# Patient Record
Sex: MALE | Employment: FULL TIME | URBAN - METROPOLITAN AREA
[De-identification: names, ages, dates, MRNs, and addresses within clinical notes are randomized per-mention and may not be internally consistent; named-entity substitution may affect disease eponyms.]

---

## 2024-07-03 ENCOUNTER — TELEPHONE (OUTPATIENT)
Dept: INTERVENTIONAL RADIOLOGY/VASCULAR | Facility: CLINIC | Age: 79
End: 2024-07-03
Payer: MEDICARE

## 2024-07-18 ENCOUNTER — OFFICE VISIT (OUTPATIENT)
Dept: INTERVENTIONAL RADIOLOGY/VASCULAR | Facility: CLINIC | Age: 79
End: 2024-07-18
Payer: MEDICARE

## 2024-07-18 VITALS
HEIGHT: 72 IN | HEART RATE: 68 BPM | BODY MASS INDEX: 21.35 KG/M2 | SYSTOLIC BLOOD PRESSURE: 149 MMHG | DIASTOLIC BLOOD PRESSURE: 80 MMHG | WEIGHT: 157.63 LBS

## 2024-07-18 DIAGNOSIS — C78.7 CANCER, METASTATIC TO LIVER: Primary | ICD-10-CM

## 2024-07-18 DIAGNOSIS — C19 COLORECTAL CANCER: ICD-10-CM

## 2024-07-18 PROCEDURE — 1159F MED LIST DOCD IN RCRD: CPT | Mod: CPTII,S$GLB,, | Performed by: FAMILY MEDICINE

## 2024-07-18 PROCEDURE — 3079F DIAST BP 80-89 MM HG: CPT | Mod: CPTII,S$GLB,, | Performed by: FAMILY MEDICINE

## 2024-07-18 PROCEDURE — 99204 OFFICE O/P NEW MOD 45 MIN: CPT | Mod: S$GLB,,, | Performed by: FAMILY MEDICINE

## 2024-07-18 PROCEDURE — 99999 PR PBB SHADOW E&M-EST. PATIENT-LVL III: CPT | Mod: PBBFAC,,, | Performed by: FAMILY MEDICINE

## 2024-07-18 PROCEDURE — 3077F SYST BP >= 140 MM HG: CPT | Mod: CPTII,S$GLB,, | Performed by: FAMILY MEDICINE

## 2024-07-18 PROCEDURE — 1160F RVW MEDS BY RX/DR IN RCRD: CPT | Mod: CPTII,S$GLB,, | Performed by: FAMILY MEDICINE

## 2024-07-18 RX ORDER — ASPIRIN 325 MG
325 TABLET ORAL DAILY
COMMUNITY

## 2024-07-18 RX ORDER — TRAZODONE HYDROCHLORIDE 50 MG/1
50 TABLET ORAL NIGHTLY
COMMUNITY

## 2024-07-18 NOTE — PROGRESS NOTES
Patient seen in IR clinic today.  Scheduled for upcoming IR procedure.  Pre-procedure instructions were reviewed with patient and family.  Patient instructed not to eat or drink anything after midnight the night before procedure.  Pt aware will need someone to provide transport home and monitor pt 8 hours post procedure.  No driving for at least 24 hours after procedure.   Patient informed that [facility] nurse will call 1 or 2 days before the procedure they will give instructions including what time to stop eating and drinking and which medications to take. Patient and family informed by NP to stop taking Aspirin and and Aspirin containing medications 5 days prior to procedure. Pt verbalized understanding of all pre-procedure instructions.  Written instructions given at appointment today/sent to patient in Mychart/portal.

## 2024-07-18 NOTE — PROGRESS NOTES
"Subjective     Patient ID: Edil Goff is a 79 y.o. male.    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 During screening colonoscopy on 8/2/2023, tumor identified and biopsied. Pathology revealed "INVASIVE ADENOCARCINOMA, MODERATELY DIFFERENTIATED (GRADE 2)." Patient underwent resection and also underwent treatment with adjuvant chemotherapy. Patient's daughter reports chemo "almost killed him." Patient was started on immunotherapy, but has shown progression despite this. MRI obtained on 6/25/2024 noted "increase in size of heterogeneously enhancing lesions in the right and left lobes of the liver the largest cyst within the dome of the right lobe and segment 8 measuring 2 cm. Previously this measured 8 mm." Patient reports feeling well. He denies any abdominal pain or abdominal distention. His family report a decreased appetite/early satiety. He is accompanied by his wife, daughter, and grandson.       Review of Systems   Constitutional:  Positive for appetite change (patient states normal; family reports early satiety) and fatigue (patient says no; wife says yes). Negative for activity change, chills and fever.   Respiratory:  Negative for cough, shortness of breath, wheezing and stridor.    Cardiovascular:  Negative for chest pain, palpitations and leg swelling.   Gastrointestinal:  Negative for abdominal distention, abdominal pain, constipation, diarrhea, nausea and vomiting.          Objective     Physical Exam  Constitutional:       General: He is not in acute distress.     Appearance: He is well-developed. He is not diaphoretic.   HENT:      Head: Normocephalic and atraumatic.   Pulmonary:      Effort: Pulmonary effort is normal. No respiratory distress.   Neurological:      Mental Status: He is alert and oriented to person, place, and time.   Psychiatric:         Behavior: Behavior normal.         " "Thought Content: Thought content normal.         Judgment: Judgment normal.       Reviewed oncology progress note in "care everywhere."    MRI 6/25/2024           Assessment and Plan     1. Cancer, metastatic to liver  -     IR RF Ablation Liver Tumors; Future; Expected date: 07/18/2024  -     Comprehensive Metabolic Panel; Future; Expected date: 07/18/2024  -     Protime-INR; Future; Expected date: 07/18/2024  -     CBC Auto Differential; Future; Expected date: 07/18/2024    2. Colorectal cancer  -     IR RF Ablation Liver Tumors; Future; Expected date: 07/18/2024  -     Comprehensive Metabolic Panel; Future; Expected date: 07/18/2024  -     Protime-INR; Future; Expected date: 07/18/2024  -     CBC Auto Differential; Future; Expected date: 07/18/2024        Discussed case with Dr. Mercado. Explained to patient can offer treatment with microwave ablation. Discussed how the procedure will be performed, risks (including, but not limited to, pain, bleeding, infection, damage to nearby structures, and the need for additional procedures), benefits, possible complications, pre-post procedure expectations, and alternatives. The patient voices understanding and all questions have been answered.  The patient agrees to proceed as planned. Patient scheduled for 8/14/2024. Pre-procedure handout with clinic phone number provided.         "

## 2024-07-18 NOTE — LETTER
July 18, 2024    Cristhian Patel MD  [address]  [place] [address]             [facility]  [address]  [facility] [address]  Phone: [phone]   Patient: Edil Goff   MR Number: [id number]   YOB: 1945   Date of Visit: 7/18/2024       Dear Dr. Cristhian Patel:    Thank you for referring Edil Goff to [facility] for evaluation. Attached you will find relevant portions of our assessment and plan of care.    If you have questions, please do not hesitate to call me. I look forward to following Edil Goff along with you.    Sincerely,      Janae Barker NP Enclosure

## 2024-07-18 NOTE — LETTER
July 18, 2024    Edil Goff  [facility]  [place] [address]     [facility] 6[facility]  [address]  [facility] [address]  Phone: [phone] PRE-PROCEDURE INSTRUCTIONS    Your procedure with Interventional Radiology is scheduled for _______________________.     Please arrive by _______________________. Please note your appointment time in [facility] will be different.    You must check-in and receive a wristband before going to your procedure. We will call you the day before to let you know your check-in location.    DO NOT take aspirin for 5 days before your procedure.    **Do not eat or drink anything between midnight and the time of your procedure. This includes gum, mints, and candy lemon drops.    **Do not smoke or drink alcoholic beverages 24 hours prior to your procedure.    **If you wear contact lenses, dentures, hearing aids, or glasses, bring a container to put them in during the procedure and give them to a family member for safekeeping.    **If you have been diagnosed with sleep apnea please bring your CPAP machine.    **If your doctor has scheduled you for an overnight stay, bring a small overnight bag with any personal items that you may need.    **Make arrangements in advance for transportation home by a responsible adult. It is not safe to drive a vehicle during the 24 hours following the procedure.    **All [facility] facilities and properties are tobacco free. Smoking is NOT allowed.    PLEASE NOTE: The procedure schedule has many variables which affect the time of your procedure. Family members should be available if your surgery time changes.    If you have any questions about these instructions call Interventional Radiology at [phone] Monday - Friday between 8:00am and 4:00pm or [phone] (ask for interventional radiology physician) for after hours.

## 2024-08-12 ENCOUNTER — DOCUMENTATION ONLY (OUTPATIENT)
Dept: PREADMISSION TESTING | Facility: HOSPITAL | Age: 79
End: 2024-08-12
Payer: MEDICARE

## 2024-08-12 RX ORDER — BUPROPION HYDROCHLORIDE 300 MG/1
300 TABLET ORAL NIGHTLY
COMMUNITY

## 2024-08-12 NOTE — PRE-PROCEDURE INSTRUCTIONS
PRE-OP INSTRUCTIONS TO PATIENT'S WIFE:  Instructed patient to have no food,milk or milk products after midnight 8/13/2024  It is ok to take AM medications with a few sips of water   Medication instructions for pm prior to and am of surgery reviewed.  Instructed patient to avoid taking vitamins,supplements,aspirin or ibuprofen the am of surgery.  Shower instructions provided    Patient's wife denies patient having any side effects or issues with anesthesia or sedation.

## 2024-08-14 ENCOUNTER — ANESTHESIA (OUTPATIENT)
Dept: INTERVENTIONAL RADIOLOGY/VASCULAR | Facility: HOSPITAL | Age: 79
End: 2024-08-14
Payer: MEDICARE

## 2024-08-14 ENCOUNTER — HOSPITAL ENCOUNTER (OUTPATIENT)
Dept: INTERVENTIONAL RADIOLOGY/VASCULAR | Facility: HOSPITAL | Age: 79
Discharge: HOME | End: 2024-08-14
Attending: FAMILY MEDICINE
Payer: MEDICARE

## 2024-08-14 VITALS
DIASTOLIC BLOOD PRESSURE: 89 MMHG | TEMPERATURE: 98 F | OXYGEN SATURATION: 100 % | HEART RATE: 53 BPM | WEIGHT: 165 LBS | SYSTOLIC BLOOD PRESSURE: 167 MMHG | BODY MASS INDEX: 22.35 KG/M2 | HEIGHT: 72 IN | RESPIRATION RATE: 16 BRPM

## 2024-08-14 DIAGNOSIS — C78.7 METASTATIC CANCER TO LIVER: ICD-10-CM

## 2024-08-14 DIAGNOSIS — C78.7 CANCER, METASTATIC TO LIVER: ICD-10-CM

## 2024-08-14 DIAGNOSIS — C19 COLORECTAL CANCER: ICD-10-CM

## 2024-08-14 DIAGNOSIS — C78.7 CANCER, METASTATIC TO LIVER: Primary | ICD-10-CM

## 2024-08-14 LAB
ALBUMIN SERPL BCP-MCNC: 3.9 G/DL (ref 3.5–5.2)
ALP SERPL-CCNC: 55 U/L (ref 55–135)
ALT SERPL W/O P-5'-P-CCNC: 13 U/L (ref 10–44)
ANION GAP SERPL CALC-SCNC: 8 MMOL/L (ref 8–16)
AST SERPL-CCNC: 19 U/L (ref 10–40)
BASOPHILS # BLD AUTO: 0.01 K/UL (ref 0–0.2)
BASOPHILS NFR BLD: 0.2 % (ref 0–1.9)
BILIRUB SERPL-MCNC: 0.8 MG/DL (ref 0.1–1)
BUN SERPL-MCNC: 19 MG/DL (ref 8–23)
CALCIUM SERPL-MCNC: 9.7 MG/DL (ref 8.7–10.5)
CHLORIDE SERPL-SCNC: 106 MMOL/L (ref 95–110)
CO2 SERPL-SCNC: 25 MMOL/L (ref 23–29)
CREAT SERPL-MCNC: 1.1 MG/DL (ref 0.5–1.4)
DIFFERENTIAL METHOD BLD: ABNORMAL
EOSINOPHIL # BLD AUTO: 0.2 K/UL (ref 0–0.5)
EOSINOPHIL NFR BLD: 2.6 % (ref 0–8)
ERYTHROCYTE [DISTWIDTH] IN BLOOD BY AUTOMATED COUNT: 13.2 % (ref 11.5–14.5)
EST. GFR  (NO RACE VARIABLE): >60 ML/MIN/1.73 M^2
GLUCOSE SERPL-MCNC: 96 MG/DL (ref 70–110)
HCT VFR BLD AUTO: 36.1 % (ref 40–54)
HGB BLD-MCNC: 11.2 G/DL (ref 14–18)
IMM GRANULOCYTES # BLD AUTO: 0.01 K/UL (ref 0–0.04)
IMM GRANULOCYTES NFR BLD AUTO: 0.2 % (ref 0–0.5)
INR PPP: 1.1 (ref 0.8–1.2)
LYMPHOCYTES # BLD AUTO: 1.2 K/UL (ref 1–4.8)
LYMPHOCYTES NFR BLD: 19.8 % (ref 18–48)
MCH RBC QN AUTO: 28 PG (ref 27–31)
MCHC RBC AUTO-ENTMCNC: 31 G/DL (ref 32–36)
MCV RBC AUTO: 90 FL (ref 82–98)
MONOCYTES # BLD AUTO: 0.7 K/UL (ref 0.3–1)
MONOCYTES NFR BLD: 12.1 % (ref 4–15)
NEUTROPHILS # BLD AUTO: 3.8 K/UL (ref 1.8–7.7)
NEUTROPHILS NFR BLD: 65.1 % (ref 38–73)
NRBC BLD-RTO: 0 /100 WBC
PLATELET # BLD AUTO: 245 K/UL (ref 150–450)
PMV BLD AUTO: 9.9 FL (ref 9.2–12.9)
POTASSIUM SERPL-SCNC: 4.3 MMOL/L (ref 3.5–5.1)
PROT SERPL-MCNC: 6.4 G/DL (ref 6–8.4)
PROTHROMBIN TIME: 11.7 SEC (ref 9–12.5)
RBC # BLD AUTO: 4 M/UL (ref 4.6–6.2)
SODIUM SERPL-SCNC: 139 MMOL/L (ref 136–145)
WBC # BLD AUTO: 5.8 K/UL (ref 3.9–12.7)

## 2024-08-14 PROCEDURE — 63600175 PHARM REV CODE 636 W HCPCS: Performed by: NURSE ANESTHETIST, CERTIFIED REGISTERED

## 2024-08-14 PROCEDURE — 63600175 PHARM REV CODE 636 W HCPCS

## 2024-08-14 PROCEDURE — 25000003 PHARM REV CODE 250: Performed by: NURSE ANESTHETIST, CERTIFIED REGISTERED

## 2024-08-14 PROCEDURE — 77013 CT GUIDE FOR TISSUE ABLATION: CPT | Mod: TC

## 2024-08-14 PROCEDURE — 37000008 HC ANESTHESIA 1ST 15 MINUTES

## 2024-08-14 PROCEDURE — 63600175 PHARM REV CODE 636 W HCPCS: Performed by: ANESTHESIOLOGY

## 2024-08-14 PROCEDURE — 80053 COMPREHEN METABOLIC PANEL: CPT | Performed by: FAMILY MEDICINE

## 2024-08-14 PROCEDURE — 85610 PROTHROMBIN TIME: CPT | Performed by: FAMILY MEDICINE

## 2024-08-14 PROCEDURE — 25000003 PHARM REV CODE 250: Performed by: STUDENT IN AN ORGANIZED HEALTH CARE EDUCATION/TRAINING PROGRAM

## 2024-08-14 PROCEDURE — 37000009 HC ANESTHESIA EA ADD 15 MINS

## 2024-08-14 PROCEDURE — 85025 COMPLETE CBC W/AUTO DIFF WBC: CPT | Performed by: FAMILY MEDICINE

## 2024-08-14 PROCEDURE — 27000221 HC OXYGEN, UP TO 24 HOURS

## 2024-08-14 PROCEDURE — 94761 N-INVAS EAR/PLS OXIMETRY MLT: CPT

## 2024-08-14 PROCEDURE — C2618 PROBE/NEEDLE, CRYO: HCPCS

## 2024-08-14 RX ORDER — FENTANYL CITRATE 50 UG/ML
INJECTION, SOLUTION INTRAMUSCULAR; INTRAVENOUS
Status: COMPLETED
Start: 2024-08-14 | End: 2024-08-14

## 2024-08-14 RX ORDER — OXYCODONE HYDROCHLORIDE 5 MG/1
10 TABLET ORAL ONCE AS NEEDED
Status: COMPLETED | OUTPATIENT
Start: 2024-08-14 | End: 2024-08-14

## 2024-08-14 RX ORDER — ONDANSETRON 4 MG/1
4 TABLET, FILM COATED ORAL EVERY 8 HOURS PRN
Qty: 15 TABLET | Refills: 0 | Status: SHIPPED | OUTPATIENT
Start: 2024-08-14

## 2024-08-14 RX ORDER — HYDROMORPHONE HYDROCHLORIDE 1 MG/ML
INJECTION, SOLUTION INTRAMUSCULAR; INTRAVENOUS; SUBCUTANEOUS
Status: COMPLETED
Start: 2024-08-14 | End: 2024-08-14

## 2024-08-14 RX ORDER — FENTANYL CITRATE 50 UG/ML
INJECTION, SOLUTION INTRAMUSCULAR; INTRAVENOUS
Status: DISCONTINUED | OUTPATIENT
Start: 2024-08-14 | End: 2024-08-14

## 2024-08-14 RX ORDER — FENTANYL CITRATE 50 UG/ML
25 INJECTION, SOLUTION INTRAMUSCULAR; INTRAVENOUS EVERY 5 MIN PRN
Status: COMPLETED | OUTPATIENT
Start: 2024-08-14 | End: 2024-08-14

## 2024-08-14 RX ORDER — DEXAMETHASONE SODIUM PHOSPHATE 4 MG/ML
INJECTION, SOLUTION INTRA-ARTICULAR; INTRALESIONAL; INTRAMUSCULAR; INTRAVENOUS; SOFT TISSUE
Status: DISCONTINUED | OUTPATIENT
Start: 2024-08-14 | End: 2024-08-14

## 2024-08-14 RX ORDER — LIDOCAINE HYDROCHLORIDE 20 MG/ML
INJECTION, SOLUTION EPIDURAL; INFILTRATION; INTRACAUDAL; PERINEURAL
Status: DISCONTINUED | OUTPATIENT
Start: 2024-08-14 | End: 2024-08-14

## 2024-08-14 RX ORDER — ONDANSETRON HYDROCHLORIDE 2 MG/ML
INJECTION, SOLUTION INTRAVENOUS
Status: DISCONTINUED | OUTPATIENT
Start: 2024-08-14 | End: 2024-08-14

## 2024-08-14 RX ORDER — HYDROMORPHONE HYDROCHLORIDE 1 MG/ML
0.2 INJECTION, SOLUTION INTRAMUSCULAR; INTRAVENOUS; SUBCUTANEOUS EVERY 5 MIN PRN
Status: DISCONTINUED | OUTPATIENT
Start: 2024-08-14 | End: 2024-08-14

## 2024-08-14 RX ORDER — DOCUSATE SODIUM 100 MG/1
100 CAPSULE, LIQUID FILLED ORAL 2 TIMES DAILY
Qty: 14 CAPSULE | Refills: 0 | Status: SHIPPED | OUTPATIENT
Start: 2024-08-14 | End: 2024-08-21

## 2024-08-14 RX ORDER — ONDANSETRON HYDROCHLORIDE 2 MG/ML
4 INJECTION, SOLUTION INTRAVENOUS DAILY PRN
Status: DISCONTINUED | OUTPATIENT
Start: 2024-08-14 | End: 2024-08-15 | Stop reason: HOSPADM

## 2024-08-14 RX ORDER — DEXMEDETOMIDINE HYDROCHLORIDE 100 UG/ML
INJECTION, SOLUTION INTRAVENOUS
Status: DISCONTINUED | OUTPATIENT
Start: 2024-08-14 | End: 2024-08-14

## 2024-08-14 RX ORDER — CEFAZOLIN SODIUM 1 G/3ML
INJECTION, POWDER, FOR SOLUTION INTRAMUSCULAR; INTRAVENOUS
Status: DISCONTINUED | OUTPATIENT
Start: 2024-08-14 | End: 2024-08-14

## 2024-08-14 RX ORDER — SODIUM CHLORIDE 9 MG/ML
INJECTION, SOLUTION INTRAVENOUS CONTINUOUS
Status: DISCONTINUED | OUTPATIENT
Start: 2024-08-14 | End: 2024-08-15 | Stop reason: HOSPADM

## 2024-08-14 RX ORDER — HYDROMORPHONE HYDROCHLORIDE 1 MG/ML
0.2 INJECTION, SOLUTION INTRAMUSCULAR; INTRAVENOUS; SUBCUTANEOUS EVERY 5 MIN PRN
Status: DISCONTINUED | OUTPATIENT
Start: 2024-08-14 | End: 2024-08-15 | Stop reason: HOSPADM

## 2024-08-14 RX ORDER — OXYCODONE HYDROCHLORIDE 5 MG/1
5 TABLET ORAL EVERY 6 HOURS PRN
Qty: 20 TABLET | Refills: 0 | Status: SHIPPED | OUTPATIENT
Start: 2024-08-14 | End: 2024-08-19

## 2024-08-14 RX ORDER — ROCURONIUM BROMIDE 10 MG/ML
INJECTION, SOLUTION INTRAVENOUS
Status: DISCONTINUED | OUTPATIENT
Start: 2024-08-14 | End: 2024-08-14

## 2024-08-14 RX ORDER — PROPOFOL 10 MG/ML
VIAL (ML) INTRAVENOUS
Status: DISCONTINUED | OUTPATIENT
Start: 2024-08-14 | End: 2024-08-14

## 2024-08-14 RX ORDER — LIDOCAINE HYDROCHLORIDE 10 MG/ML
1 INJECTION, SOLUTION EPIDURAL; INFILTRATION; INTRACAUDAL; PERINEURAL ONCE
Status: DISCONTINUED | OUTPATIENT
Start: 2024-08-14 | End: 2024-08-15 | Stop reason: HOSPADM

## 2024-08-14 RX ORDER — LABETALOL HCL 20 MG/4 ML
SYRINGE (ML) INTRAVENOUS
Status: DISCONTINUED
Start: 2024-08-14 | End: 2024-08-15 | Stop reason: HOSPADM

## 2024-08-14 RX ORDER — LABETALOL HCL 20 MG/4 ML
10 SYRINGE (ML) INTRAVENOUS ONCE
Status: COMPLETED | OUTPATIENT
Start: 2024-08-14 | End: 2024-08-14

## 2024-08-14 RX ORDER — AMOXICILLIN AND CLAVULANATE POTASSIUM 875; 125 MG/1; MG/1
1 TABLET, FILM COATED ORAL 2 TIMES DAILY
Qty: 10 TABLET | Refills: 0 | Status: SHIPPED | OUTPATIENT
Start: 2024-08-14

## 2024-08-14 RX ORDER — GLUCAGON 1 MG
1 KIT INJECTION
Status: DISCONTINUED | OUTPATIENT
Start: 2024-08-14 | End: 2024-08-15 | Stop reason: HOSPADM

## 2024-08-14 RX ADMIN — FENTANYL CITRATE 25 MCG: 50 INJECTION INTRAMUSCULAR; INTRAVENOUS at 02:08

## 2024-08-14 RX ADMIN — HYDROMORPHONE HYDROCHLORIDE 0.2 MG: 1 INJECTION, SOLUTION INTRAMUSCULAR; INTRAVENOUS; SUBCUTANEOUS at 02:08

## 2024-08-14 RX ADMIN — LIDOCAINE HYDROCHLORIDE 100 MG: 20 INJECTION, SOLUTION EPIDURAL; INFILTRATION; INTRACAUDAL; PERINEURAL at 11:08

## 2024-08-14 RX ADMIN — FENTANYL CITRATE 25 MCG: 50 INJECTION INTRAMUSCULAR; INTRAVENOUS at 01:08

## 2024-08-14 RX ADMIN — PROPOFOL 100 MG: 10 INJECTION, EMULSION INTRAVENOUS at 11:08

## 2024-08-14 RX ADMIN — CEFAZOLIN 2 G: 330 INJECTION, POWDER, FOR SOLUTION INTRAMUSCULAR; INTRAVENOUS at 11:08

## 2024-08-14 RX ADMIN — FENTANYL CITRATE 25 MCG: 50 INJECTION, SOLUTION INTRAMUSCULAR; INTRAVENOUS at 12:08

## 2024-08-14 RX ADMIN — SODIUM CHLORIDE: 0.9 INJECTION, SOLUTION INTRAVENOUS at 11:08

## 2024-08-14 RX ADMIN — SUGAMMADEX 200 MG: 100 INJECTION, SOLUTION INTRAVENOUS at 01:08

## 2024-08-14 RX ADMIN — Medication 10 MG: at 03:08

## 2024-08-14 RX ADMIN — ONDANSETRON 4 MG: 2 INJECTION INTRAMUSCULAR; INTRAVENOUS at 12:08

## 2024-08-14 RX ADMIN — ROCURONIUM BROMIDE 10 MG: 10 INJECTION INTRAVENOUS at 12:08

## 2024-08-14 RX ADMIN — DEXMEDETOMIDINE 8 MCG: 200 INJECTION, SOLUTION INTRAVENOUS at 01:08

## 2024-08-14 RX ADMIN — ROCURONIUM BROMIDE 50 MG: 10 INJECTION INTRAVENOUS at 11:08

## 2024-08-14 RX ADMIN — FENTANYL CITRATE 50 MCG: 50 INJECTION, SOLUTION INTRAMUSCULAR; INTRAVENOUS at 11:08

## 2024-08-14 RX ADMIN — OXYCODONE HYDROCHLORIDE 10 MG: 5 TABLET ORAL at 02:08

## 2024-08-14 RX ADMIN — DEXAMETHASONE SODIUM PHOSPHATE 4 MG: 4 INJECTION, SOLUTION INTRAMUSCULAR; INTRAVENOUS at 12:08

## 2024-08-14 NOTE — TRANSFER OF CARE
Anesthesia Transfer of Care Note    Patient: Edil Goff    Procedure(s) Performed: * No procedures listed *    Patient location: PACU    Anesthesia Type: general    Transport from OR: Transported from OR on 6-10 L/min O2 by face mask with adequate spontaneous ventilation    Post pain: adequate analgesia    Post assessment: no apparent anesthetic complications    Post vital signs: stable    Level of consciousness: awake and alert    Nausea/Vomiting: no nausea/vomiting    Complications: none    Transfer of care protocol was followed      Last vitals: Visit Vitals  BP (!) 176/84 (BP Location: Right arm, Patient Position: Lying)   Pulse 73   Temp 36.7 °C (98.1 °F) (Temporal)   Resp 14   Ht 6' (1.829 m)   Wt 74.8 kg (165 lb)   SpO2 99%   BMI 22.38 kg/m²

## 2024-08-14 NOTE — DISCHARGE SUMMARY
Radiology Discharge Summary      Hospital Course: No complications    Admit Date: 8/14/2024  Discharge Date: 08/14/2024     Instructions Given to Patient: Yes  Diet: Resume prior diet  Activity: activity as tolerated and no driving for today    Description of Condition on Discharge: Stable  Vital Signs (Most Recent): Temp: 98.1 °F (36.7 °C) (08/14/24 0828)  Pulse: 78 (08/14/24 0828)  Resp: 16 (08/14/24 0828)  BP: (!) 173/82 (08/14/24 0829)  SpO2: 100 % (08/14/24 0828)    Discharge Disposition: Home    Discharge Diagnosis: [facility]     Follow-up: MRI Abd WWO in 4-6 weeks.     Aspen Day MD

## 2024-08-14 NOTE — NURSING TRANSFER
Nursing Transfer Note      8/14/2024   4:18 PM    Nurse giving handoff: Samy JIMÉNEZ Rn  Nurse receiving handoff: DOSC Rn    Reason patient is being transferred: DOSC    Transfer To: #34 [facility]    Transfer via stretcher    Transfer with n/a    Transported by Rn    Transfer Vital Signs:  Blood Pressure:see flowsheet  Heart Rate:  O2:  Temperature:  Respirations:    Telemetry: n/a  Order for Tele Monitor? N/a    Additional Lines: n/a    4eyes on Skin: yes    Medicines sent: n/a    Any special needs or follow-up needed:     Patient belongings transferred with patient: Yes    Chart send with patient: Yes    Notified: spouse    Patient reassessed at: 8/14/24  1  Upon arrival to floor: bed in lowest position

## 2024-08-14 NOTE — PLAN OF CARE
Pt arrived to IR  for MWA with anesthesia. Pt oriented to unit and staff, Pt safely transferred from stretcher to procedural table. Fall risk reviewed and comfort measures utilized with interventions. Safety strap applied, position pillows to minimize pressure points. Blankets applied. Pt prepped and draped utilizing standard sterile technique. Patient placed on continuous monitoring, as required by sedation policy. Timeouts implemented utilizing standard universal time-out per department and facility policy. CRNA to remain at bedside with continuous monitoring. Pt resting comfortably. Denies pain/discomfort. Will continue to monitor. See anesthesia flow sheets for monitoring, medication administration, and updates. patient verbalizes understanding.

## 2024-08-14 NOTE — PROGRESS NOTES
Pt arrived from PACU. Report given by MARY ANN Pablo at the bedside. Vitals stable, dressings clean, dry, intact. Updated pt and family on plan of care.

## 2024-08-14 NOTE — H&P
Radiology History & Physical      SUBJECTIVE:     Chief Complaint: Metastatic Colorectal cancer    History of Present Illness:  Edil Goff is a 79 y.o. male with history of colon cancer with metastases to the liver. He underwent resection and adjuvant chemotherapy. Recent MRI showed heterogeneously enhancing lesions in the right and left lobes of the liver.     Patient presents today for microwave ablation of the hepatic lesions as shown below.         Past Medical History:   Diagnosis Date    Cancer     colon    Hyperlipidemia     Hypertension     TIA (transient ischemic attack)      Past Surgical History:   Procedure Laterality Date    COLON SURGERY  2023    resection    TONSILLECTOMY         Home Meds:   Prior to Admission medications    Medication Sig Start Date End Date Taking? Authorizing Provider   amLODIPine (NORVASC) 5 MG tablet Take 5 mg by mouth every morning.   Yes Provider, Historical   ascorbic acid (VITAMIN C ORAL) Take by mouth every morning.   Yes Provider, Historical   buPROPion (WELLBUTRIN XL) 300 MG 24 hr tablet Take 300 mg by mouth nightly.   Yes Provider, Historical   ergocalciferol, vitamin D2, (VITAMIN D2 ORAL) Take by mouth once daily.   Yes Provider, Historical   EScitalopram oxalate (LEXAPRO) 10 MG tablet Take 10 mg by mouth every morning.   Yes Provider, Historical   losartan (COZAAR) 50 MG tablet Take 50 mg by mouth every morning.   Yes Provider, Historical   rivastigmine tartrate (EXELON) 1.5 MG capsule Take 1.5 mg by mouth 2 (two) times daily.   Yes Provider, Historical   traZODone (DESYREL) 50 MG tablet Take 50 mg by mouth every evening.   Yes Provider, Historical   aspirin 325 MG tablet Take 325 mg by mouth once daily.    Provider, Historical   clonazePAM (KLONOPIN) 1 MG tablet Take 1 mg by mouth nightly as needed.    Provider, Historical     Anticoagulants/Antiplatelets: no anticoagulation    Allergies: Review of patient's allergies indicates:  No Known Allergies  Sedation  History:  no adverse reactions    Review of Systems:   Hematological: no known coagulopathies  Respiratory: no shortness of breath  Cardiovascular: no chest pain  Gastrointestinal: no abdominal pain  Genito-Urinary: no dysuria  Musculoskeletal: negative  Neurological: no TIA or stroke symptoms, slow to respond, states from recent chemotherapy        OBJECTIVE:     Vital Signs (Most Recent)  Temp: 98.1 °F (36.7 °C) (08/14/24 0828)  Pulse: 78 (08/14/24 0828)  Resp: 16 (08/14/24 0828)  BP: (!) 173/82 (08/14/24 0829)  SpO2: 100 % (08/14/24 0828)    Physical Exam:  ASA: per anesthesia  Mallampati: per anesthesia    General: no acute distress, anxious  Mental Status: alert and oriented to person, place and time, slow to respond (related to chemotherapy)  HEENT: normocephalic, atraumatic  Chest: unlabored breathing  Heart: regular heart rate  Abdomen: nondistended, nontender  Extremity: moves all extremities, no BL LE edema    Laboratory  Lab Results   Component Value Date    INR 1.1 08/14/2024       Lab Results   Component Value Date    WBC 5.80 08/14/2024    HGB 11.2 (L) 08/14/2024    HCT 36.1 (L) 08/14/2024    MCV 90 08/14/2024     08/14/2024      Lab Results   Component Value Date    GLU 96 08/14/2024     08/14/2024    K 4.3 08/14/2024     08/14/2024    CO2 25 08/14/2024    BUN 19 08/14/2024    CREATININE 1.1 08/14/2024    CALCIUM 9.7 08/14/2024    ALT 13 08/14/2024    AST 19 08/14/2024    ALBUMIN 3.9 08/14/2024    BILITOT 0.8 08/14/2024       ASSESSMENT/PLAN:     Sedation Plan: Per anesthesia  Patient will undergo MWA of the liver lesions.    Dianna Mercado MD  Radiology, PGY IV  [facility]

## 2024-08-14 NOTE — NURSING
Pt admitted in room 1 on SSCU. Pt is free from s/s of pain/distress. VS taken and wnl. Pt's preop questions completed and iv placed. Pt needs consents. Safety measures in place.

## 2024-08-14 NOTE — PROCEDURES
Interventional Radiology Procedure Note      Pre Op Diagnosis: mCRC  Post Op Diagnosis: Same    Procedure: CT-guided microwave ablation    Procedure performed by:  Aspen Day MD    Written Informed Consent Obtained: Yes  Specimen Removed: NO   Estimated Blood Loss: Minimal    Findings:     CT guided microwave ablation of segment 2 and 8 liver lesions.     Patient tolerated procedure well.         Aspen Day MD  Interventional Radiology

## 2024-08-14 NOTE — ANESTHESIA PREPROCEDURE EVALUATION
08/14/2024  Edil Goff is a 79 y.o., male.      Pre-op Assessment    I have reviewed the Patient Summary Reports.     I have reviewed the Nursing Notes. I have reviewed the NPO Status.   I have reviewed the Medications.     Review of Systems  Anesthesia Hx:  No problems with previous Anesthesia   History of prior surgery of interest to airway management or planning:            Denies Personal Hx of Anesthesia complications.                    Social:  Non-Smoker, No Alcohol Use       Hematology/Oncology:       -- Anemia:                  Current/Recent Cancer.                EENT/Dental:  EENT/Dental Normal           Cardiovascular:  Exercise tolerance: good   Hypertension       Denies Angina.       Denies PRINCE.                            Pulmonary:  Pulmonary Normal      Denies Shortness of breath.                  Renal/:  Renal/ Normal                 Hepatic/GI:     GERD, well controlled   Colon cancer with mets to the liver          Musculoskeletal:  Musculoskeletal Normal                Neurological:  TIA,         TIA several years ago                            Endocrine:  Endocrine Normal            Dermatological:  Skin Normal    Psych:  Psychiatric Normal                    Physical Exam  General: Well nourished, Cooperative, Alert and Oriented    Airway:  Mallampati: II / I  Mouth Opening: Normal  TM Distance: Normal  Tongue: Normal  Neck ROM: Normal ROM    Dental:  Intact    Chest/Lungs:  Clear to auscultation, Normal Respiratory Rate    Heart:  Rate: Normal  Rhythm: Regular Rhythm  Sounds: Normal        Anesthesia Plan  Type of Anesthesia, risks & benefits discussed:    Anesthesia Type: Gen ETT  Intra-op Monitoring Plan: Standard ASA Monitors  Post Op Pain Control Plan: multimodal analgesia and IV/PO Opioids PRN  Induction:  IV  Airway Plan: Video, Post-Induction  Informed Consent:  Informed consent signed with the Patient and all parties understand the risks and agree with anesthesia plan.  All questions answered.   ASA Score: 3  Day of Surgery Review of History & Physical: H&P Update referred to the surgeon/provider.    Ready For Surgery From Anesthesia Perspective.     .

## 2024-08-14 NOTE — PLAN OF CARE
Pt is AAOX4,VSS. Discharge instructions reviewed, pt and family verbalizes understanding. All questions answered. IV removed. IR team came to bedside and spoke with family. Bedrest completed, cleared for discharge. Pt ready for discharge.

## 2024-08-14 NOTE — ANESTHESIA RELEASE NOTE
Anesthesia Release from PACU Note    Patient: Edil Goff    Procedure(s) Performed: * No procedures listed *    Anesthesia type: general    Post pain: Adequate analgesia    Post assessment: no apparent anesthetic complications    Last Vitals: Visit Vitals  BP (!) 167/78 (BP Location: Left arm, Patient Position: Lying)   Pulse (!) 58   Temp 36.7 °C (98.1 °F) (Temporal)   Resp 20   Ht 6' (1.829 m)   Wt 74.8 kg (165 lb)   SpO2 (!) 93%   BMI 22.38 kg/m²       Post vital signs: stable    Level of consciousness: awake    Nausea/Vomiting: no nausea/no vomiting    Complications: none    Airway Patency: patent    Respiratory: unassisted    Cardiovascular: hypertension treated with Labetalol    Hydration: euvolemic

## 2024-08-14 NOTE — ANESTHESIA PROCEDURE NOTES
Intubation    Date/Time: 8/14/2024 11:47 AM    Performed by: Lombardi, Nicole A., CRNA  Authorized by: Marylu Velasquez MD    Intubation:     Induction:  Intravenous    Intubated:  Postinduction    Mask Ventilation:  Easy with oral airway    Attempts:  1    Attempted By:  CRNA    Method of Intubation:  Video laryngoscopy    Blade:  Srinivasan 3    Laryngeal View Grade: Grade I - full view of cords      Difficult Airway Encountered?: No      Complications:  None    Airway Device:  Oral endotracheal tube    Airway Device Size:  7.5    Style/Cuff Inflation:  Cuffed (inflated to minimal occlusive pressure)    Tube secured:  22    Secured at:  The lips    Placement Verified By:  Capnometry    Complicating Factors:  None    Findings Post-Intubation:  BS equal bilateral and atraumatic/condition of teeth unchanged

## 2024-08-14 NOTE — ANESTHESIA POSTPROCEDURE EVALUATION
Anesthesia Post Evaluation    Patient: Edil Goff    Procedure(s) Performed: * No procedures listed *    Final Anesthesia Type: general      Patient location during evaluation: PACU  Patient participation: Yes- Able to Participate  Level of consciousness: awake and alert  Post-procedure vital signs: reviewed and stable  Pain management: adequate  Airway patency: patent    PONV status at discharge: No PONV  Anesthetic complications: no      Cardiovascular status: blood pressure returned to baseline  Respiratory status: spontaneous ventilation and room air  Hydration status: euvolemic  Follow-up not needed.              Vitals Value Taken Time   /90 08/14/24 1444   Pulse 68 08/14/24 1444   Resp 29 08/14/24 1444   SpO2 100 % 08/14/24 1444   Vitals shown include unfiled device data.      No case tracking events are documented in the log.      Pain/Charanjit Score: Pain Rating Prior to Med Admin: 9 (8/14/2024  2:45 PM)  Charanjit Score: 10 (8/14/2024  2:30 PM)

## 2024-08-14 NOTE — NURSING
Microwave ablation of liver tumor complete. Pt tolerated well. VSS. No signs or symptoms of distress noted per CRNA. Pt will be transferred to PACU bed after extubation/stabilization escorted by RN and CRNA who will give report.

## 2024-08-14 NOTE — DISCHARGE INSTRUCTIONS
For scheduling: Call Erinn at [phone]    For questions or concerns call: STEFFI MON-FRI 8 AM- 5PM [phone]. Radiology resident on call [phone].    For immediate concerns that are not emergent, you may call our radiology clinic at: [phone]    May remove dressing in 24 hours and shower.   Do Not sit in bath water, hot tub, or swim for 7 days   Do not drive for 3 days   Do not lift anything heavier than 10 lbs for the next 3 days

## 2024-08-16 ENCOUNTER — TELEPHONE (OUTPATIENT)
Dept: INTERVENTIONAL RADIOLOGY/VASCULAR | Facility: CLINIC | Age: 79
End: 2024-08-16
Payer: MEDICARE

## 2024-09-12 ENCOUNTER — TELEPHONE (OUTPATIENT)
Dept: INTERVENTIONAL RADIOLOGY/VASCULAR | Facility: CLINIC | Age: 79
End: 2024-09-12
Payer: MEDICARE

## 2024-10-10 ENCOUNTER — OFFICE VISIT (OUTPATIENT)
Dept: INTERVENTIONAL RADIOLOGY/VASCULAR | Facility: CLINIC | Age: 79
End: 2024-10-10
Payer: MEDICARE

## 2024-10-10 VITALS
HEIGHT: 72 IN | WEIGHT: 165.88 LBS | SYSTOLIC BLOOD PRESSURE: 172 MMHG | DIASTOLIC BLOOD PRESSURE: 83 MMHG | BODY MASS INDEX: 22.47 KG/M2 | HEART RATE: 73 BPM

## 2024-10-10 DIAGNOSIS — C19 COLORECTAL CANCER: Primary | ICD-10-CM

## 2024-10-10 DIAGNOSIS — C78.7 CANCER, METASTATIC TO LIVER: ICD-10-CM

## 2024-10-10 PROCEDURE — 1159F MED LIST DOCD IN RCRD: CPT | Mod: CPTII,S$GLB,, | Performed by: FAMILY MEDICINE

## 2024-10-10 PROCEDURE — 1160F RVW MEDS BY RX/DR IN RCRD: CPT | Mod: CPTII,S$GLB,, | Performed by: FAMILY MEDICINE

## 2024-10-10 PROCEDURE — 99213 OFFICE O/P EST LOW 20 MIN: CPT | Mod: S$GLB,,, | Performed by: FAMILY MEDICINE

## 2024-10-10 PROCEDURE — 3079F DIAST BP 80-89 MM HG: CPT | Mod: CPTII,S$GLB,, | Performed by: FAMILY MEDICINE

## 2024-10-10 PROCEDURE — 99999 PR PBB SHADOW E&M-EST. PATIENT-LVL IV: CPT | Mod: PBBFAC,,, | Performed by: FAMILY MEDICINE

## 2024-10-10 PROCEDURE — 3077F SYST BP >= 140 MM HG: CPT | Mod: CPTII,S$GLB,, | Performed by: FAMILY MEDICINE

## 2024-10-10 NOTE — PROGRESS NOTES
"Subjective     Patient ID: Edil Goff is a 79 y.o. male.    Chief Complaint: Cancer    Patient here for follow up of metastatic colorectal cancer to the liver. During screening colonoscopy on 8/2/2023, tumor identified and biopsied. Pathology revealed "INVASIVE ADENOCARCINOMA, MODERATELY DIFFERENTIATED (GRADE 2)." Patient underwent resection and also underwent treatment with adjuvant chemotherapy. Patient's daughter reports chemo "almost killed him." Patient was started on immunotherapy, but showed progression despite this.     MRI obtained on 6/25/2024 noted "increase in size of heterogeneously enhancing lesions in the right and left lobes of the liver the largest cyst within the dome of the right lobe and segment 8 measuring 2 cm. Previously this measured 8 mm." He underwent treatment with microwave ablation on 8/14/2024. He had a follow up MRI on 9/23/2024.    Patient reports feeling well. He denies any abdominal pain or abdominal distention. His family report a decreased appetite/early satiety. He is accompanied by his daughter.    Reviewed oncology progress note.       Review of Systems   Constitutional:  Negative for activity change, appetite change, chills, fatigue and fever.   Respiratory:  Negative for cough, shortness of breath, wheezing and stridor.    Cardiovascular:  Negative for chest pain, palpitations and leg swelling.   Gastrointestinal:  Negative for abdominal distention, abdominal pain, constipation, diarrhea, nausea and vomiting.          Objective     Physical Exam  Constitutional:       General: He is not in acute distress.     Appearance: He is well-developed. He is not diaphoretic.   HENT:      Head: Normocephalic and atraumatic.   Pulmonary:      Effort: Pulmonary effort is normal. No respiratory distress.   Neurological:      Mental Status: He is alert and oriented to person, place, and time.   Psychiatric:         Behavior: Behavior normal.         Thought Content: Thought content " normal.         Judgment: Judgment normal.     MRI 9/23/2024  FINDINGS:     Heart size is normal. Lung bases are unremarkable.     Overall liver size is normal. Liver parenchyma has normal signal.  No new suspicious liver mass.  Right hepatic lobe treated metastasis is seen measuring 3.1 x 2.5 cm with no enhancement. There is T2 shine through with intralesional hemorrhage in this   same lesion. Similar MR findings of the prior left hepatic lobe treated metastasis measuring 3.5 x 2.6 cm. No new liver metastasis is seen. The hepatic calcified granulomas are not well appreciated on this exam. There are also a few hepatic simple simple    cysts. No intrahepatic biliary ductal dilatation is seen.     Gallbladder is unremarkable.  CBD is normal.     Pancreas has normal homogeneous T1 signal and homogeneous enhancement. No enhancing pancreatic masses are seen. 3 mm stable mid pancreatic body cystic lesion is seen. The main pancreatic duct is normal in caliber.     Adrenal glands are normal.     Spleen is normal. Calcified splenic granulomas are seen. No splenic mass.     There is normal corticomedullary differentiation. No hydronephrosis is seen. No enhancing renal masses are seen.  Multiple bilateral numerous renal simple cysts along with bilateral hemorrhagic cysts are seen.     Small hiatal hernia is noted.     The included small bowel is unremarkable. Copious stool is seen in the nondistended colon and may suggest constipation.     Stable 2.6 x 1.8 cm left para-aortic lymphadenopathy is seen measuring 1.8 x 2.6 cm.  No new lymphadenopathy is seen. No free fluid is noted.     Aorta is normal in caliber.     No acute bony process is seen. No suspicious osseous lesion is seen.     IMPRESSION:     Interval liver treated metastasis without disease progression     Stable retroperitoneal lymphadenopathy     Stable 3 mm mid pancreatic body cystic lesion     Bilateral renal simple and hemorrhagic cysts        Assessment and  Plan     1. Colorectal cancer  -     Ambulatory referral/consult to Oncology; Future; Expected date: 10/17/2024    2. Cancer, metastatic to liver  -     Cancel: Ambulatory referral/consult to Oncology; Future; Expected date: 10/17/2024  -     MRI Abdomen W WO Contrast; Future; Expected date: 12/23/2024  -     Creatinine, serum; Future; Expected date: 10/10/2024  -     Ambulatory referral/consult to Oncology; Future; Expected date: 10/17/2024        Reviewed MRI with Dr. Day. Explained to patient and daughter MRI shows good response to treatment. Recommendation is to repeat MRI in 3 months. Patient and daughter verbalize understanding and agreement.     They request a referral to oncology here as they would like to establish care with [facility]. Referral placed.

## 2024-10-11 ENCOUNTER — TELEPHONE (OUTPATIENT)
Dept: HEMATOLOGY/ONCOLOGY | Facility: CLINIC | Age: 79
End: 2024-10-11
Payer: MEDICARE

## 2024-10-11 NOTE — NURSING
Spoke to Mr. Goff daughter regarding referral to Oncology appt scheduled time and location confirmed she was given my direct contact information she verbalized understanding to all   Oncology Navigation   Intake  Cancer Type: GI (Cancer, metastatic to liver C19 (ICD-10-CM) - Colorectal cancer)  Type of Referral: Internal  Date of Referral: 10/10/24  Initial Nurse Navigator Contact: 10/11/24  Referral to Initial Contact Timeline (days): 1  First Appointment Available: 10/15/24 (Requsted a specific date)  Appointment Date: 10/29/24  First Available Date vs. Scheduled Date (days): 13     Treatment  Current Status: Staging work-up       Medical Oncologist: Dr Aurash Khoobehi  Consult Date: 10/29/24                       Acuity      Follow Up  No follow-ups on file.

## 2024-10-29 ENCOUNTER — OFFICE VISIT (OUTPATIENT)
Dept: HEMATOLOGY/ONCOLOGY | Facility: CLINIC | Age: 79
End: 2024-10-29
Payer: MEDICARE

## 2024-10-29 ENCOUNTER — LAB VISIT (OUTPATIENT)
Dept: LAB | Facility: HOSPITAL | Age: 79
End: 2024-10-29
Attending: INTERNAL MEDICINE
Payer: MEDICARE

## 2024-10-29 VITALS
OXYGEN SATURATION: 98 % | SYSTOLIC BLOOD PRESSURE: 160 MMHG | HEART RATE: 69 BPM | DIASTOLIC BLOOD PRESSURE: 74 MMHG | TEMPERATURE: 98 F | RESPIRATION RATE: 17 BRPM | WEIGHT: 169.06 LBS | HEIGHT: 72 IN | BODY MASS INDEX: 22.9 KG/M2

## 2024-10-29 DIAGNOSIS — C19 COLORECTAL CANCER: Primary | ICD-10-CM

## 2024-10-29 DIAGNOSIS — E61.1 IRON DEFICIENCY: ICD-10-CM

## 2024-10-29 DIAGNOSIS — C78.7 CANCER, METASTATIC TO LIVER: ICD-10-CM

## 2024-10-29 DIAGNOSIS — C19 COLORECTAL CANCER: ICD-10-CM

## 2024-10-29 PROCEDURE — 1159F MED LIST DOCD IN RCRD: CPT | Mod: CPTII,S$GLB,, | Performed by: INTERNAL MEDICINE

## 2024-10-29 PROCEDURE — 3077F SYST BP >= 140 MM HG: CPT | Mod: CPTII,S$GLB,, | Performed by: INTERNAL MEDICINE

## 2024-10-29 PROCEDURE — 1160F RVW MEDS BY RX/DR IN RCRD: CPT | Mod: CPTII,S$GLB,, | Performed by: INTERNAL MEDICINE

## 2024-10-29 PROCEDURE — 99999 PR PBB SHADOW E&M-EST. PATIENT-LVL IV: CPT | Mod: PBBFAC,,, | Performed by: INTERNAL MEDICINE

## 2024-10-29 PROCEDURE — 3288F FALL RISK ASSESSMENT DOCD: CPT | Mod: CPTII,S$GLB,, | Performed by: INTERNAL MEDICINE

## 2024-10-29 PROCEDURE — 1101F PT FALLS ASSESS-DOCD LE1/YR: CPT | Mod: CPTII,S$GLB,, | Performed by: INTERNAL MEDICINE

## 2024-10-29 PROCEDURE — 1126F AMNT PAIN NOTED NONE PRSNT: CPT | Mod: CPTII,S$GLB,, | Performed by: INTERNAL MEDICINE

## 2024-10-29 PROCEDURE — 99205 OFFICE O/P NEW HI 60 MIN: CPT | Mod: S$GLB,,, | Performed by: INTERNAL MEDICINE

## 2024-10-29 PROCEDURE — 36415 COLL VENOUS BLD VENIPUNCTURE: CPT | Performed by: INTERNAL MEDICINE

## 2024-10-29 PROCEDURE — 3078F DIAST BP <80 MM HG: CPT | Mod: CPTII,S$GLB,, | Performed by: INTERNAL MEDICINE

## 2024-10-29 RX ORDER — FERROUS SULFATE 325(65) MG
325 TABLET ORAL DAILY
Qty: 30 TABLET | Refills: 3 | Status: SHIPPED | OUTPATIENT
Start: 2024-10-29 | End: 2025-10-29

## 2024-11-04 ENCOUNTER — HOSPITAL ENCOUNTER (OUTPATIENT)
Dept: RADIOLOGY | Facility: HOSPITAL | Age: 79
Discharge: HOME | End: 2024-11-04
Attending: INTERNAL MEDICINE
Payer: MEDICARE

## 2024-11-04 DIAGNOSIS — C19 COLORECTAL CANCER: ICD-10-CM

## 2024-11-04 PROCEDURE — 71250 CT THORAX DX C-: CPT | Mod: 26,,, | Performed by: RADIOLOGY

## 2024-11-04 PROCEDURE — 71250 CT THORAX DX C-: CPT | Mod: TC

## 2024-11-08 LAB
ONEOME COMMENT: NORMAL
ONEOME METHOD: NORMAL

## 2024-11-12 ENCOUNTER — OFFICE VISIT (OUTPATIENT)
Dept: HEMATOLOGY/ONCOLOGY | Facility: CLINIC | Age: 79
End: 2024-11-12
Payer: MEDICARE

## 2024-11-12 VITALS
RESPIRATION RATE: 18 BRPM | HEART RATE: 68 BPM | BODY MASS INDEX: 22.84 KG/M2 | OXYGEN SATURATION: 99 % | SYSTOLIC BLOOD PRESSURE: 168 MMHG | WEIGHT: 168.63 LBS | TEMPERATURE: 98 F | DIASTOLIC BLOOD PRESSURE: 77 MMHG | HEIGHT: 72 IN

## 2024-11-12 DIAGNOSIS — C78.7 CANCER, METASTATIC TO LIVER: ICD-10-CM

## 2024-11-12 DIAGNOSIS — E61.1 IRON DEFICIENCY: ICD-10-CM

## 2024-11-12 DIAGNOSIS — C78.01 MALIGNANT NEOPLASM METASTATIC TO BOTH LUNGS: ICD-10-CM

## 2024-11-12 DIAGNOSIS — C78.02 MALIGNANT NEOPLASM METASTATIC TO BOTH LUNGS: ICD-10-CM

## 2024-11-12 DIAGNOSIS — C19 COLORECTAL CANCER: Primary | ICD-10-CM

## 2024-11-12 PROCEDURE — 3078F DIAST BP <80 MM HG: CPT | Mod: CPTII,S$GLB,, | Performed by: INTERNAL MEDICINE

## 2024-11-12 PROCEDURE — 1101F PT FALLS ASSESS-DOCD LE1/YR: CPT | Mod: CPTII,S$GLB,, | Performed by: INTERNAL MEDICINE

## 2024-11-12 PROCEDURE — 1159F MED LIST DOCD IN RCRD: CPT | Mod: CPTII,S$GLB,, | Performed by: INTERNAL MEDICINE

## 2024-11-12 PROCEDURE — 1126F AMNT PAIN NOTED NONE PRSNT: CPT | Mod: CPTII,S$GLB,, | Performed by: INTERNAL MEDICINE

## 2024-11-12 PROCEDURE — 99999 PR PBB SHADOW E&M-EST. PATIENT-LVL IV: CPT | Mod: PBBFAC,,, | Performed by: INTERNAL MEDICINE

## 2024-11-12 PROCEDURE — 99214 OFFICE O/P EST MOD 30 MIN: CPT | Mod: S$GLB,,, | Performed by: INTERNAL MEDICINE

## 2024-11-12 PROCEDURE — 3288F FALL RISK ASSESSMENT DOCD: CPT | Mod: CPTII,S$GLB,, | Performed by: INTERNAL MEDICINE

## 2024-11-12 PROCEDURE — 3077F SYST BP >= 140 MM HG: CPT | Mod: CPTII,S$GLB,, | Performed by: INTERNAL MEDICINE

## 2024-11-12 RX ORDER — DIPHENHYDRAMINE HYDROCHLORIDE 50 MG/ML
50 INJECTION INTRAMUSCULAR; INTRAVENOUS ONCE AS NEEDED
OUTPATIENT
Start: 2024-11-12

## 2024-11-12 RX ORDER — SODIUM CHLORIDE 0.9 % (FLUSH) 0.9 %
10 SYRINGE (ML) INJECTION
OUTPATIENT
Start: 2024-11-12

## 2024-11-12 RX ORDER — EPINEPHRINE 0.3 MG/.3ML
0.3 INJECTION SUBCUTANEOUS ONCE AS NEEDED
OUTPATIENT
Start: 2024-11-12

## 2024-11-12 RX ORDER — HEPARIN 100 UNIT/ML
500 SYRINGE INTRAVENOUS
OUTPATIENT
Start: 2024-11-12

## 2024-11-12 NOTE — PROGRESS NOTES
Service Date:  11/12/24    Chief Complaint: Colon Cancer (Labs  CT)    Edil Goff is a 79 y.o. male here with metastatic colorectal cancer.  Patient has a history of right hemicolectomy with pathology showing a [id number] lesion.  Patient was then started on adjuvant FOLFOX, but had poor tolerance after 1 cycle.  The 5 FU bolus was dropped and oxaliplatin dose was lowered, but patient still continued to have poor tolerance had severe protein calorie malnutrition following chemotherapy.  Therefore it was decided to be held.  Patient then had 2 liver lesions biopsied positive to be metastatic colon cancer.  He was then started on Vectibix with no improvement in the lesions, which were ultimately treated with SIRT.  He has no longer been on Vectibix for a few months.    Here to discuss pharmacogenomic panel along with CT chest.  Patient remains asymptomatic.    Review of Systems   Constitutional: Negative.  Negative for appetite change and unexpected weight change.   HENT: Negative.  Negative for mouth sores.    Eyes: Negative.  Negative for visual disturbance.   Respiratory: Negative.  Negative for cough and shortness of breath.    Cardiovascular: Negative.  Negative for chest pain.   Gastrointestinal: Negative.  Negative for abdominal pain and diarrhea.   Endocrine: Negative.    Genitourinary:  Positive for frequency.   Musculoskeletal: Negative.  Negative for back pain.   Integumentary:  Negative for rash. Negative.   Neurological: Negative.  Negative for headaches.   Hematological: Negative.  Negative for adenopathy.   Psychiatric/Behavioral:  The patient is nervous/anxious.         Current Outpatient Medications   Medication Instructions    amLODIPine (NORVASC) 5 mg, Every morning    amoxicillin-clavulanate 875-125mg (AUGMENTIN) 875-125 mg per tablet 1 tablet, Oral, 2 times daily    ascorbic acid (VITAMIN C ORAL) Every morning    aspirin 325 mg, Daily    buPROPion (WELLBUTRIN XL) 300 mg, Nightly    clonazePAM  (KLONOPIN) 1 mg, Nightly PRN    ergocalciferol, vitamin D2, (VITAMIN D2 ORAL) Daily    EScitalopram oxalate (LEXAPRO) 10 mg, Every morning    ferrous sulfate (FEOSOL) 325 mg, Oral, Daily    losartan (COZAAR) 50 mg, Every morning    ondansetron (ZOFRAN) 4 mg, Oral, Every 8 hours PRN    rivastigmine tartrate (EXELON) 1.5 mg, 2 times daily    traZODone (DESYREL) 50 mg, Nightly        Past Medical History:   Diagnosis Date    Cancer     colon    Hyperlipidemia     Hypertension     TIA (transient ischemic attack)         Past Surgical History:   Procedure Laterality Date    COLON SURGERY  2023    resection    TONSILLECTOMY          No family history on file.    Social History     Tobacco Use    Smoking status: Never    Smokeless tobacco: Never   Substance Use Topics    Alcohol use: Never    Drug use: Never         Vitals:    11/12/24 1059   BP: (!) 168/77   Pulse: 68   Resp: 18   Temp: 97.8 °F (36.6 °C)        Physical Exam:  BP (!) 168/77 (BP Location: Right arm, Patient Position: Sitting)   Pulse 68   Temp 97.8 °F (36.6 °C) (Temporal)   Resp 18   Ht 6' (1.829 m)   Wt 76.5 kg (168 lb 10.4 oz)   SpO2 99%   BMI 22.87 kg/m²     Physical Exam  Vitals and nursing note reviewed.   Constitutional:       Appearance: Normal appearance.   HENT:      Head: Normocephalic and atraumatic.      Nose: Nose normal.      Mouth/Throat:      Mouth: Mucous membranes are moist.      Pharynx: Oropharynx is clear.   Eyes:      Extraocular Movements: Extraocular movements intact.      Conjunctiva/sclera: Conjunctivae normal.   Cardiovascular:      Rate and Rhythm: Normal rate and regular rhythm.      Heart sounds: Normal heart sounds.   Pulmonary:      Effort: Pulmonary effort is normal.      Breath sounds: Normal breath sounds.   Abdominal:      General: Abdomen is flat. Bowel sounds are normal.      Palpations: Abdomen is soft.   Musculoskeletal:         General: Normal range of motion.      Cervical back: Normal range of motion and  neck supple.   Skin:     General: Skin is warm and dry.   Neurological:      General: No focal deficit present.      Mental Status: He is alert and oriented to person, place, and time. Mental status is at baseline.   Psychiatric:         Mood and Affect: Mood normal.          Labs:  Lab Results   Component Value Date    WBC 5.80 08/14/2024    RBC 4.00 (L) 08/14/2024    HGB 11.2 (L) 08/14/2024    HCT 36.1 (L) 08/14/2024    MCV 90 08/14/2024    MCH 28.0 08/14/2024    MCHC 31.0 (L) 08/14/2024    RDW 13.2 08/14/2024     08/14/2024    MPV 9.9 08/14/2024    GRAN 3.8 08/14/2024    GRAN 65.1 08/14/2024    LYMPH 1.2 08/14/2024    LYMPH 19.8 08/14/2024    MONO 0.7 08/14/2024    MONO 12.1 08/14/2024    EOS 0.2 08/14/2024    BASO 0.01 08/14/2024    EOSINOPHIL 2.6 08/14/2024    BASOPHIL 0.2 08/14/2024     Sodium   Date Value Ref Range Status   08/14/2024 139 136 - 145 mmol/L Final     Potassium   Date Value Ref Range Status   08/14/2024 4.3 3.5 - 5.1 mmol/L Final     Chloride   Date Value Ref Range Status   08/14/2024 106 95 - 110 mmol/L Final     CO2   Date Value Ref Range Status   08/14/2024 25 23 - 29 mmol/L Final     Glucose   Date Value Ref Range Status   08/14/2024 96 70 - 110 mg/dL Final     BUN   Date Value Ref Range Status   08/14/2024 19 8 - 23 mg/dL Final     Creatinine   Date Value Ref Range Status   08/14/2024 1.1 0.5 - 1.4 mg/dL Final     Calcium   Date Value Ref Range Status   08/14/2024 9.7 8.7 - 10.5 mg/dL Final     Total Protein   Date Value Ref Range Status   08/14/2024 6.4 6.0 - 8.4 g/dL Final     Albumin   Date Value Ref Range Status   08/14/2024 3.9 3.5 - 5.2 g/dL Final     Total Bilirubin   Date Value Ref Range Status   08/14/2024 0.8 0.1 - 1.0 mg/dL Final     Comment:     For infants and newborns, interpretation of results should be based  on gestational age, weight and in agreement with clinical  observations.    Premature Infant recommended reference ranges:  Up to 24 hours.............<8.0  mg/dL  Up to 48 hours............<12.0 mg/dL  3-5 days..................<15.0 mg/dL  6-29 days.................<15.0 mg/dL       Alkaline Phosphatase   Date Value Ref Range Status   08/14/2024 55 55 - 135 U/L Final     AST   Date Value Ref Range Status   08/14/2024 19 10 - 40 U/L Final     ALT   Date Value Ref Range Status   08/14/2024 13 10 - 44 U/L Final     Anion Gap   Date Value Ref Range Status   08/14/2024 8 8 - 16 mmol/L Final       A/P:    Metastatic colon adenocarcinoma   -oligometastatic with 2 lesions in the liver treated with SIRT  -after initial visit with me, CT chest was done which shows numerous lung nodules suspicious for metastasis, not previously present  -pharmacogenomic shows that patient has a poor metabolizer of 5 FU, but after discussing with pharmacy, it can be given at 50% dosage  -I explained that the patient with all the lung Mets is not curable in the all treatment would be palliative.  The family was present.  I spent a lot of time discussing this along with the overall diagnosis and prognosis.  I informed them that I would like to treat him with 5 FU pump at 50% dose along with the Avastin given that it is a right-sided cancer originally.  They stated that they would like to think about this and will get back with me     Iron deficiency   -will give IV iron      Aurash Khoobehi, MD  Hematology and Oncology

## 2024-11-13 ENCOUNTER — TELEPHONE (OUTPATIENT)
Dept: HEMATOLOGY/ONCOLOGY | Facility: CLINIC | Age: 79
End: 2024-11-13
Payer: MEDICARE

## 2024-11-13 NOTE — TELEPHONE ENCOUNTER
Left vm to notify pt's daughter that msg was received and MD will be notified of decision.     ----- Message from Joie sent at 11/13/2024  8:22 AM CST -----  Kalani simms dtr would like a call back. The family has decided to schedule 1st chemo and would like to have it done with iron infusion    Cb [phone]

## 2024-11-14 ENCOUNTER — TELEPHONE (OUTPATIENT)
Dept: HEMATOLOGY/ONCOLOGY | Facility: CLINIC | Age: 79
End: 2024-11-14
Payer: MEDICARE

## 2024-11-14 NOTE — TELEPHONE ENCOUNTER
Left vm returning pt's call.     ----- Message from Aaliyah sent at 11/14/2024 11:21 AM CST -----  Pt's daughter, Kalani, requests call back from Sangita. Forgot to ask a question when they spoke earlier.     CB: [phone] (DESK PHONE)

## 2024-11-14 NOTE — TELEPHONE ENCOUNTER
Incoming return call from pt's daughter inquiring if pt should go on trip or wait to get the first dose of chemo. This nurse explained that Dr Khoobehi is okay with pt going on the trip and potentially starting tx the wk of Dec 2.

## 2024-11-14 NOTE — TELEPHONE ENCOUNTER
Spoke with pt's daughter per request. She states that pt would like to proceed with chemo tx, but he would like to go to [place] for Thanksgiving, She would like to know if it is okay to wait until after then to start tx.    ----- Message from Aaliyah sent at 11/14/2024  9:00 AM CST -----  Pt's daughter, Kalani, calling to speak to Sangita about her dad's first chemo treatments.     CB: [phone] (Please call this number. This is the phone at her desk).

## 2024-11-15 DIAGNOSIS — C19 COLORECTAL CANCER: Primary | ICD-10-CM

## 2024-11-21 ENCOUNTER — OFFICE VISIT (OUTPATIENT)
Dept: HEMATOLOGY/ONCOLOGY | Facility: CLINIC | Age: 79
End: 2024-11-21
Payer: MEDICARE

## 2024-11-21 ENCOUNTER — INFUSION (OUTPATIENT)
Dept: INFUSION THERAPY | Facility: HOSPITAL | Age: 79
End: 2024-11-21
Attending: INTERNAL MEDICINE
Payer: MEDICARE

## 2024-11-21 VITALS
WEIGHT: 168.88 LBS | RESPIRATION RATE: 18 BRPM | SYSTOLIC BLOOD PRESSURE: 130 MMHG | TEMPERATURE: 97 F | DIASTOLIC BLOOD PRESSURE: 62 MMHG | HEIGHT: 72 IN | HEART RATE: 76 BPM | OXYGEN SATURATION: 100 % | BODY MASS INDEX: 22.87 KG/M2

## 2024-11-21 VITALS
WEIGHT: 166 LBS | SYSTOLIC BLOOD PRESSURE: 144 MMHG | TEMPERATURE: 98 F | DIASTOLIC BLOOD PRESSURE: 67 MMHG | BODY MASS INDEX: 22.48 KG/M2 | HEIGHT: 72 IN | OXYGEN SATURATION: 98 % | HEART RATE: 66 BPM | RESPIRATION RATE: 16 BRPM

## 2024-11-21 DIAGNOSIS — C19 COLORECTAL CANCER: Primary | ICD-10-CM

## 2024-11-21 DIAGNOSIS — E61.1 IRON DEFICIENCY: Primary | ICD-10-CM

## 2024-11-21 PROCEDURE — 99999 PR PBB SHADOW E&M-EST. PATIENT-LVL V: CPT | Mod: PBBFAC,,, | Performed by: NURSE PRACTITIONER

## 2024-11-21 PROCEDURE — 63600175 PHARM REV CODE 636 W HCPCS: Performed by: INTERNAL MEDICINE

## 2024-11-21 PROCEDURE — 25000003 PHARM REV CODE 250: Performed by: INTERNAL MEDICINE

## 2024-11-21 PROCEDURE — 96365 THER/PROPH/DIAG IV INF INIT: CPT

## 2024-11-21 PROCEDURE — 3075F SYST BP GE 130 - 139MM HG: CPT | Mod: CPTII,S$GLB,, | Performed by: NURSE PRACTITIONER

## 2024-11-21 PROCEDURE — 99215 OFFICE O/P EST HI 40 MIN: CPT | Mod: S$GLB,,, | Performed by: NURSE PRACTITIONER

## 2024-11-21 PROCEDURE — 1126F AMNT PAIN NOTED NONE PRSNT: CPT | Mod: CPTII,S$GLB,, | Performed by: NURSE PRACTITIONER

## 2024-11-21 PROCEDURE — 3078F DIAST BP <80 MM HG: CPT | Mod: CPTII,S$GLB,, | Performed by: NURSE PRACTITIONER

## 2024-11-21 PROCEDURE — 3288F FALL RISK ASSESSMENT DOCD: CPT | Mod: CPTII,S$GLB,, | Performed by: NURSE PRACTITIONER

## 2024-11-21 PROCEDURE — 1159F MED LIST DOCD IN RCRD: CPT | Mod: CPTII,S$GLB,, | Performed by: NURSE PRACTITIONER

## 2024-11-21 PROCEDURE — A4216 STERILE WATER/SALINE, 10 ML: HCPCS | Performed by: INTERNAL MEDICINE

## 2024-11-21 PROCEDURE — 1101F PT FALLS ASSESS-DOCD LE1/YR: CPT | Mod: CPTII,S$GLB,, | Performed by: NURSE PRACTITIONER

## 2024-11-21 RX ORDER — DIPHENHYDRAMINE HYDROCHLORIDE 50 MG/ML
50 INJECTION INTRAMUSCULAR; INTRAVENOUS ONCE AS NEEDED
OUTPATIENT
Start: 2024-11-28

## 2024-11-21 RX ORDER — EPINEPHRINE 0.3 MG/.3ML
0.3 INJECTION SUBCUTANEOUS ONCE AS NEEDED
OUTPATIENT
Start: 2024-11-28

## 2024-11-21 RX ORDER — SODIUM CHLORIDE 0.9 % (FLUSH) 0.9 %
10 SYRINGE (ML) INJECTION
OUTPATIENT
Start: 2024-11-28

## 2024-11-21 RX ORDER — HEPARIN 100 UNIT/ML
500 SYRINGE INTRAVENOUS
OUTPATIENT
Start: 2024-11-28

## 2024-11-21 RX ORDER — ONDANSETRON HYDROCHLORIDE 8 MG/1
8 TABLET, FILM COATED ORAL EVERY 8 HOURS PRN
Qty: 30 TABLET | Refills: 2 | Status: SHIPPED | OUTPATIENT
Start: 2024-11-21 | End: 2025-11-21

## 2024-11-21 RX ORDER — PROCHLORPERAZINE MALEATE 10 MG
10 TABLET ORAL EVERY 6 HOURS PRN
Qty: 30 TABLET | Refills: 1 | Status: SHIPPED | OUTPATIENT
Start: 2024-11-21 | End: 2025-11-21

## 2024-11-21 RX ORDER — SODIUM CHLORIDE 0.9 % (FLUSH) 0.9 %
10 SYRINGE (ML) INJECTION
Status: DISCONTINUED | OUTPATIENT
Start: 2024-11-21 | End: 2024-11-21 | Stop reason: HOSPADM

## 2024-11-21 RX ORDER — HEPARIN 100 UNIT/ML
500 SYRINGE INTRAVENOUS
Status: DISCONTINUED | OUTPATIENT
Start: 2024-11-21 | End: 2024-11-21 | Stop reason: HOSPADM

## 2024-11-21 RX ORDER — LIDOCAINE AND PRILOCAINE 25; 25 MG/G; MG/G
CREAM TOPICAL
Qty: 30 G | Refills: 0 | Status: SHIPPED | OUTPATIENT
Start: 2024-11-21

## 2024-11-21 RX ADMIN — Medication 10 ML: at 02:11

## 2024-11-21 RX ADMIN — HEPARIN 500 UNITS: 100 SYRINGE at 04:11

## 2024-11-21 RX ADMIN — SODIUM CHLORIDE 250 MG: 9 INJECTION, SOLUTION INTRAVENOUS at 02:11

## 2024-11-21 RX ADMIN — Medication 10 ML: at 04:11

## 2024-11-21 NOTE — PROGRESS NOTES
FOLLOW-UP APPOINTMENT    PATIENT:   Edil Goff  :    1945  MR#:    [id number]  DATE OF VISIT:  2024      Chief Complaint:  Metastatic colon cancer    HPI:   Mr. Edil Goff presents today for chemotherapy education.  He will be starting treatment with leucovorin/fluorouracil/Avastin for the above diagnosis.       Review of Systems   Constitutional:  Positive for fatigue.   HENT:  Negative.     Eyes: Negative.    Respiratory: Negative.     Cardiovascular: Negative.    Gastrointestinal: Negative.    Endocrine: Negative.    Genitourinary: Negative.     Musculoskeletal: Negative.    Skin: Negative.    Neurological: Negative.    Psychiatric/Behavioral: Negative.       Oncology History   Colorectal cancer   2023 Cancer Staged    Staging form: Gastrointestinal Stromal Tumor - Gastric and Omental GIST, AJCC 8th Edition  - Pathologic stage from 2023: Stage IV (pT4, pN1, cM1, Mitotic Rate: Low)     2024 Initial Diagnosis    Colorectal cancer     2024 -  Chemotherapy    Treatment Summary   Plan Name: OP GI bevacizumab leucovorin fluorouracil Q2W  Treatment Goal: Palliative  Status: Active  Start Date: 2024 (Planned)  End Date: 2025 (Planned)  Provider: Aurash Khoobehi, MD  Chemotherapy: fluorouracil (Adrucil) 1,200 mg/m2 = 2,365 mg in 0.9% NaCl 100 mL chemo infusion, 1,200 mg/m2 = 2,365 mg (original dose ), Intravenous, Over 46 hours, 0 of 6 cycles  Dose modification: 1,200 mg/m2 (Cycle 1, Reason: Dose not tolerated)  bevacizumab-awwb (MVASI) 5 mg/kg = 385 mg in 0.9% NaCl 100 mL infusion, 5 mg/kg, Intravenous, Clinic/HOD 1 time, 0 of 6 cycles         Patient Active Problem List   Diagnosis    Iron deficiency    Colorectal cancer    Cancer, metastatic to liver    Malignant neoplasm metastatic to both lungs       Past Medical History:   Diagnosis Date    Cancer     colon    Hyperlipidemia     Hypertension     TIA (transient ischemic attack)        Past Surgical History:    Procedure Laterality Date    COLON SURGERY  2023    resection    TONSILLECTOMY         Social History     Socioeconomic History    Marital status:    Tobacco Use    Smoking status: Never    Smokeless tobacco: Never   Substance and Sexual Activity    Alcohol use: Never    Drug use: Never     Social Drivers of Health     Financial Resource Strain: Low Risk  (7/22/2020)    Received from [facility]    Overall Financial Resource Strain (CARDIA)     Difficulty of Paying Living Expenses: Not hard at all   Food Insecurity: No Food Insecurity (7/22/2020)    Received from [facility]    Hunger Vital Sign     Worried About Running Out of Food in the Last Year: Never true     Ran Out of Food in the Last Year: Never true   Transportation Needs: No Transportation Needs (7/22/2020)    Received from [facility]    PRAPARE - Transportation     Lack of Transportation (Medical): No     Lack of Transportation (Non-Medical): No   Physical Activity: Insufficiently Active (7/22/2020)    Received from [facility]    Exercise Vital Sign     Days of Exercise per Week: 1 day     Minutes of Exercise per Session: 60 min   Stress: Stress Concern Present (7/22/2020)    Received from [facility] - Occupational Stress Questionnaire     Feeling of Stress : To some extent       No family history on file.      Current Outpatient Medications:     amLODIPine (NORVASC) 5 MG tablet, Take 5 mg by mouth every morning., Disp: , Rfl:     amoxicillin-clavulanate 875-125mg (AUGMENTIN) 875-125 mg per tablet, Take 1 tablet by mouth 2 (two) times daily. (Patient not taking: Reported on 11/12/2024), Disp: 10 tablet, Rfl: 0    ascorbic acid (VITAMIN C ORAL), Take by mouth every morning., Disp: , Rfl:     aspirin 325  MG tablet, Take 325 mg by mouth once daily., Disp: , Rfl:     buPROPion (WELLBUTRIN XL) 300 MG 24 hr tablet, Take 300 mg by mouth nightly., Disp: , Rfl:     clonazePAM (KLONOPIN) 1 MG tablet, Take 1 mg by mouth nightly as needed., Disp: , Rfl:     ergocalciferol, vitamin D2, (VITAMIN D2 ORAL), Take by mouth once daily., Disp: , Rfl:     EScitalopram oxalate (LEXAPRO) 10 MG tablet, Take 10 mg by mouth every morning., Disp: , Rfl:     ferrous sulfate (FEOSOL) 325 mg (65 mg iron) Tab tablet, Take 1 tablet (325 mg total) by mouth once daily., Disp: 30 tablet, Rfl: 3    losartan (COZAAR) 50 MG tablet, Take 50 mg by mouth every morning., Disp: , Rfl:     ondansetron (ZOFRAN) 4 MG tablet, Take 1 tablet (4 mg total) by mouth every 8 (eight) hours as needed for Nausea. (Patient not taking: Reported on 11/12/2024), Disp: 15 tablet, Rfl: 0    rivastigmine tartrate (EXELON) 1.5 MG capsule, Take 1.5 mg by mouth 2 (two) times daily., Disp: , Rfl:     traZODone (DESYREL) 50 MG tablet, Take 50 mg by mouth every evening., Disp: , Rfl:     Review of patient's allergies indicates:  No Known Allergies    Physcial Examination  VITAL SIGNS:    There is no height or weight on file to calculate BSA.   Pain Assessment: Controlled   There were no vitals filed for this visit.    Wt Readings from Last 5 Encounters:   11/12/24 76.5 kg (168 lb 10.4 oz)   10/29/24 76.7 kg (169 lb 1.5 oz)   10/10/24 75.3 kg (165 lb 14.3 oz)   08/14/24 74.8 kg (165 lb)   07/18/24 71.5 kg (157 lb 10.1 oz)       GENERAL:  Edil Goff is healthy-appearing 79 y.o. male, in no distress.   EYES:   Pupils equal, round, reactive.  Conjunctivae, sclera and lids normal.  HEENT: Head normocephalic and atraumatic, without alopecia.  Oropharynx is unremarkable.  No icterus, jaundice, stomatitis, mucositis, or ulceration is noted.  Ears are clear and unremarkable.  Nose, nares, and septum are unremarkable.    NECK:   No masses.  Thyroid and trachea are normal.     BREASTS:  Deferred.  RESPIRATORY: Clear to auscultation bilaterally.  Symmetrically effortless expansion.  No wheezing and no stridor.    CV: Heart reveals regular rate and rhythm without murmur, rub, or gallops.  ABDOMEN: Soft, non-tender.  No masses, no hernias, and no rebound or rigidity are noted.  /RECTAL:  Deferred.  LYMPHATICS: No preauricular, submandibular, cervical, supraclavicular, axillary, lymphadenopathy.  MUSCULOSKELETAL:Fair musculature, no atrophy.  No arthritic changes.  No edema or cyanosis. Back is without gross abnormal curvature.   NEUROLOGICAL: Cranial nerves II-XII grossly intact.  Motor and sensory exam intact.  SKIN:   No lesions, bruises, petechiae or rashes.  Good turgor.    PSYCHIATRIC: Patient is alert and oriented to time, place and person.  Mood and affect are appropriate.         Laboratory and Radiology   Lab Results   Component Value Date    WBC 5.80 08/14/2024    RBC 4.00 (L) 08/14/2024    HGB 11.2 (L) 08/14/2024    HCT 36.1 (L) 08/14/2024    MCV 90 08/14/2024    MCH 28.0 08/14/2024    MCHC 31.0 (L) 08/14/2024    RDW 13.2 08/14/2024     08/14/2024    MPV 9.9 08/14/2024    GRAN 3.8 08/14/2024    GRAN 65.1 08/14/2024    LYMPH 1.2 08/14/2024    LYMPH 19.8 08/14/2024    MONO 0.7 08/14/2024    MONO 12.1 08/14/2024    EOS 0.2 08/14/2024    BASO 0.01 08/14/2024    EOSINOPHIL 2.6 08/14/2024    BASOPHIL 0.2 08/14/2024     BMP  Lab Results   Component Value Date     08/14/2024    K 4.3 08/14/2024     08/14/2024    CO2 25 08/14/2024    BUN 19 08/14/2024    CREATININE 1.1 08/14/2024    CALCIUM 9.7 08/14/2024    ANIONGAP 8 08/14/2024     Lab Results   Component Value Date    ALT 13 08/14/2024    AST 19 08/14/2024    ALKPHOS 55 08/14/2024    BILITOT 0.8 08/14/2024     Results for orders placed or performed during the hospital encounter of 11/04/24 (from the past 2160 hours)   CT Chest Without Contrast    Narrative    EXAMINATION:  CT CHEST WITHOUT CONTRAST    CLINICAL  HISTORY:  Colon cancer, metastatic, staging; Malignant neoplasm of rectosigmoid junction    TECHNIQUE:  Low dose axial images, sagittal and coronal reformations were obtained from the thoracic inlet to the lung bases. Contrast was not administered.    COMPARISON:  Images from a study 02/16/2024    FINDINGS:  Right hilar mediastinal adenopathy.  There are vascular calcifications including coronary artery calcifications.  There is timi catheter extending into the distal SVC/cavoatrial junction.  Ectatic ascending aorta with diameter 3.9 cm.    Calcified granulomas noted the spleen.  Partial visualization of left renal masses likely cysts.    Areas of low-density in the liver spanning multiple hepatic masses.  One of these anteriorly left lobe of the liver appears larger than what was seen on prior study.    Lung; numerous new bilateral pulmonary nodules.  Target nodules none right lower lobe series 4, image 388; 6 mm..  Also right lower lobe series 4, image 177 6 mm.    In the left lung axial series 4, image 217, left lower lobe nodule measuring 3-4 mm      Impression    New innumerable bilateral pulmonary nodules consistent metastatic disease    Hepatic and renal masses not fully evaluated on the chest study      Electronically signed by: Mel Romero MD  Date:    11/04/2024  Time:    17:44     No results found for this or any previous visit (from the past 2160 hours).  No results found for this or any previous visit (from the past 2160 hours).    Pathology  Pathology Results  (Last 10 years)                 08/02/23 0000  Specimen to Pathology Final result    10/26/22 0000  Specimen to Pathology Final result    Narrative:      [facility]   DEPARTMENT OF PATHOLOGY AND DERMATOPATHOLOGY   [address] [place] [address], [address] * [place]  [address] *  PHONE [phone] * [phone] * [phone]   FELICIA AYALA M.D., DERMATOPATHOLOGIST * MILENA JEFFERSON M.D., PATHOLOGIST   GIFTY PORRAS M.D.,  "PATHOLOGIST * GOLDY MURILLO M.D., HEMATOPATHOLOGIST * JACK CHAVIRA M.D., PATHOLOGIST       PATIENT NAME: CHERYL GOFF   HISTORY NUMBER: [id number]   : [id number]   SEX: M   SERVICE: 10/26/2022   RECEIVED: 10/27/2022   REPORTED: 10/28/2022   PHYSICIAN: TESSA HILARIO ([id number]), M.D.   LOCATION: [facility]ER[facility]   DEPT #: [id number]   CLINICAL DATA   Specimen A: Left mid lower back       Specimen Collection Notes and Impression   A: r/o NMSC       Associated Diagnoses   D49.2-Neoplasm of unspecified behavior of bone, soft tissue, and skin   GROSS DESCRIPTION   A. Received in formalin labeled "Cheryl Goff, left mid back" is a 13 x 9 x 1 mm skin shave; inked black and serially sectioned.  TS1, mp.  mm/jana   Block labeled [id number]   MICROSCOPIC DESCRIPTION   A. Sections show ulceration, hyperkeratosis, parakeratosis and segments of the epidermis showing premalignant dyskeratosis with sparing of appendages.   MICROSCOPIC DIAGNOSIS   A - LEFT MID LOWER BACK   ULCERATED ACTINIC KERATOSIS       Unless "gross only" is specified, the final diagnosis for each specimen is based on a microscopic examination of representative sections of the tissue.       If applicable, controls were reviewed and showed appropriate reactivity.       Electronically signed by: FELICIA AYALA MD               TITLE: PLAN OF CARE FOR THE CHEMOTHERAPY PATIENT / TEACHING PROTOCOL    PURPOSE: To involve the patient / significant other in the plan of care and to provide teaching to the significant other & patient receiving chemotherapy.    LEVEL: Independent.    CONTENT: The Plan of Care for the chemotherapy patient is individualized and appropriate to the patients needs, strengths, limitations, & goals.  Education includes information regarding chemotherapy side effects, the treatment itself, and self-care  Activities.    GOAL / OUTCOME STANDARDS    PHYSIOLOGIC: The client will remain free or experience minimal side effects or toxicities " throughout the chemotherapy treatment period.     PSYCHOLOGIC: The client/significant others will demonstrate positive coping mechanisms in relation to chemotherapy and its side effects.      COGINITIVE: The client/significant others will verbalize understanding of self-care measure to avoid/minimize side effects of the chemotherapy regime.    EVALUATION / COMMENT KEY:    V = Audiovisual/Video  S = Successfully meets outcome  N = Needs further instruction  NA = Not applicable to the patient  P = Previous knowledge  U = Unable to comprehend  * = See progress notes          PLAN OF CARE  INFORMATION TO BE DELIVERED / NURSING INTERVENTIONS DATE EVALUATION   Assessment of client/caregiver,         knowledge of cancer diagnosis,         and chemotherapy as a treatment. 1a. Evaluate patient/caregiver learning ability    b. Plan teaching sessions with patient/caregiver according to needs and present anxiety level/ability to learn.    c. Provide Chemotherapy Education Packet,        Mouth Care Protocol,         Specific Patient Education Sheets. 11/21/2024 S   Individual chemotherapy treatment         plan. 2a. Review of Chemotherapy Education handout from [employer]            11/21/2024   S   Knowledge Deficit & Self-Management of general side effects common to all chemotherapy:  Nausea/Vomiting  b.   Diarrhea  Mouth Care  Dental care  Constipation  Hair Loss  Potential for infection  Fatigue   3a. Reinforce that the majority of side effects from chemotherapy are reversible and are  controlled both in the hospital and at home        (blood counts recover, hair grows back).   b.  Refer to the following for reinforcement of         information post-treatment:  Mouth Care Protocol.  Bowel Protocol for constipation or diarrhea.  3.  Drug Specific Chemotherapy Information Sheets for each medication patient receiving.    11/21/2024     S     PLAN OF CARE  INFORMATION TO BE DELIVERED / NURSING INTERVENTIONS DATE EVALUATION    h. Potential for bleeding         i. Potential anemia/fatigue         j. Potential sunburn         k. Birth control measures  l. Safety measures post treatment 4.  Chemotherapy Home Care Instruction  and Safety Information Sheet.  A. patient/caregivers to thoroughly cook shellfish (shrimp, crab, etc) to decrease the chance of infection.    B.  Use sunscreen and protective clothing while in the sun.   11/21/2024      Knowledge deficit & Self Management of Drug Specific  Side Effects.    BLADDER EFFECTS        (Hemorrhagic Cystitis)                  Preventable with adequate hydration; occurs 2-3 days or more post treatment.   1.  Instruct patient to:  a.   Void at least every 2 hours; increase intake.  b.   DO NOT hold urine; go when urge is felt.  c.    Empty bladder at bedtime and on         awakening.  d.   Observe for color changes (red to tea           colored), amount and frequency changes.  e.   Notify oncologist of any abnormalities           in urine or voiding or if you cannot               drink adequate fluids.   11/21/2024   S   b.   CHANGES IN URINE        COLOR:      1.   Instruct patient:  a.   Most evident in first 2-3 voidings after           administration.  Lasts less than 24 hours.  If urine is discolored 2 or more days post- treatment, notify oncologist.      11/21/2024 S   c.    KIDNEY EFFECTS           (Nephrotoxicity)   1.  Instruct patient to:  a.   Drink 8-16 glasses of fluid/day the day   pre-treatment and 3-4 days post-treatment to maintain hydration; the best way to minimize kidney problems.  b.   Notify oncologist immediately if unable to drink fluids or if changes are noted in urinary elimination.     11/21/2024   S   PULMONARY TOXICITY    Instruct patient to report symptoms such as shortness of breath, chest pain, shallow breathing, or chest wall discomfort to physician.  Reinforce preventative measures used by the health care team.  Baseline and periodic PFT and chest x-ray.    11/21/2024   S     PLAN OF CARE INFORMATION TO BE DELIVERED / NURSING INTERVENTIONS DATE EVALUATION   NERVE & MUSCLE EFFECTS (neurotoxocity; neuropathy, possible visual/hearing changes)        Instruct patient to:    Report numbness or tingling of the hands/feet, loss of fine motor movement (buttoning shirt, tying shoelaces), or gait changes to your oncologist.  If numbness/tingling are present:  protect feet with shoes at all times.  Use gloves for washing dishes/gardening & potholders in kitchen.       11/21/2024   S   CARDIOTOXICITY  Decreased effectiveness of             cardiac function. Effective are                  cumulative and irreversible.                                    CARDIAC ARRYTHMIAS              4   Instruct:  Heart function may be tested before treatment and perdiocally during treatment.  Notify oncologist of irregular pulse, palpitations, shortness of breath, or swelling in lower extremities/feet.          Taxol and Taxotere can cause arrhythmias on infusion that resolve once infusion discontinued. Instruct nurse if any irregularity felt.    11/21/2024   S   EXTRAVASTION  Occurs when vesicants leak outside of vein and cause damage to the skin and underlying tissues.   Reinforce preventive measures used to avoid complications.  Fresh IV site or central line monitored continuously with vesicant IVP.  Continuous infusion via central line site and blood return monitored periodically around the clock.  Instruct to:  Notify nurse of any discomfort, burning, stinging, etc. at IV site during chemotherapy administration.  Notify oncologist of any redness, pain, or swelling at IV site after discharge from hospital.   11/21/2024   S   HYPERSENSITIVITY can happen with any medication.   Instruct patient:  Nurse is with them during the initial part of treatment and will be close by to monitor.  Pre-medication ordered by the oncologist must be taken on time. If doses are missed, treatment will need to be  re-scheduled.  Skin redness, itching, or hives appearing after discharge should be reported to oncologist. 11/21/2024   S       PLAN OF CARE INFORMATION TO BE DELIVERED / NURSING INTERVENTIONS DATE EVALUATION   FLU-LIKE SYNDROME      Instruct patient symptoms are hard to prevent and may include fever, shaking chills, muscle and body aches.  Taking prescribed medications from physician if needed.  Adequate fluids are important.    Reinforce the need to call if temperature is         elevated to 100.4 or more  11/21/2024   S   HAND-FOOT SYNDROME  causes painful, symmetric swelling and redness of palms and soles                  Instruct patient to report any numbness or tingling in the hands or feet.  Explain prevention techniques, such as     Use heavy moisturizers to lessen skin dryness and itching, but to avoid if skin is cracked or broken  Bathe in tepid water, use non-perfumed soap, and wash gently. Baths with oatmeal or diluted baking soda may be soothing.  Avoid tight fitting shoes and repetitive actions, such as rubbing hands or applying pressure to hands/feet.  Review measures to take should syndrome occur:  Cold compresses and elevation for          edema  Pain medications and other measures as ordered by oncologist.   4.   Syndrome resolves few weeks after therapy. 11/21/2024   S   5. DISCHARGE PLANNING /        EDUCATION 1.    Explain importance of compliance with follow- up  tests (CBC, CMP).  2.    Verify patient/caregiver know:  a.    Oncologists office phone number.  b.    Dates of follow-up appointments.  c.    Prescriptions given for nausea  3.   Review side effects to monitor and notify          oncologist about.  4.   Reinforce the need for patient and caregivers to:  a.    Review information given.  b.    Call oncologists office with questions          or symptoms  5.   Provide [facility] Brochure make referrals if needed for financial or .   11/21/2024   S      PROGRESS NOTES: I met with the patient  today for chemotherapy education. he will be starting treatment with leucovorin/fluorouracil/Avastin every 2 weeks . We discussed the mechanism of action, potential side effects of this treatment as well as ways he can manage them at home. Some of these side effects include but or not limited to fever, nausea, vomiting, decreased appetite, fatigue, weakness, cytopenias, myalgia/arthralgia, constipation, diarrhea, bleeding, headache, shortness of breath, nail changes, taste change, hair thinning/loss, mood disturbances, or edema. We also discussed dietary modifications he should make although this will be discussed in more detail with the dietician. he was provided with anti-emetic medication, a copy of all of the information we discussed today as well as our contact information. he will be provided a schedule on his first day of treatment. We will obtain labs on a weekly basis and the patient will follow-up with the physician for toxicity monitoring throughout treatment. All questions were answered and an informed consent was obtained. he was reminded to certainly contact us sooner if needed.  Attached to the patients folder and discussed with the patient the 24 hour/ 7 days a week after hours telephone number for the physician.  Patient notified to call anytime 24/7 because their is a physician on call for any problems that may arise.  Patient also notified to report to [facility] ER if they can not get in touch with a physician after hours.  Discussed the five wishes booklet with the patient and their family.           Assessment/Plan   Metastatic colon cancer:   -see MD prior to cycle 1 for clearance  -MD reviewed PgX panel      Medications Ordered:  Zofran 8mg 1 tab PO Q8h prn nausea  Compazine 10 mg PO Q4-6h prn nausea  Emla cream: Apply to port site 30min prior to treatment and cover with saran wrap  Claritin 10mg PO QD day of chemotherapy and for 5 days after  Neulasta to help prevent bone pain  OTC NSAIDS  Take 2 PO QD day of and for 5 days after Neulasta to help prevent bone pain  OTC Imodium as directed  OTC MiraLax 17 g daily for constipation    * please notify clinic or report to the emergency department for fever of 100.4 grade  * avoid tobacco and alcohol use  * maintain a healthy diet and good oral hydration  * good blood pressure and blood sugar control is important.  Please keep all followups with your primary care provider  *good hand hygiene  * please call clinic with any questions or concerns  * please take all medications as directed     Patient is in agreement with the proposed treatment plan. All questions were answered to the patient's satisfaction. Patient knows to call clinic for any new or worsening symptoms and if anything is needed before the next clinic visit.    Melonie RAMOS  Hematology & Medical Oncology     Collaborating physician, Dr. Khoobehi.    50 minutes of total time spent on the encounter, which includes face-to-face time and non face-to-face time preparing to see the patient (e.g., review of tests), obtaining and/or reviewing separately obtain history, documenting clinical information in the electronic or other health record, independently interpreting results (not separately reported) and communicating results to the patient/family/caregiver or care coordination (not separately reported).  Electronically signed by: Melonie RAMOS

## 2024-11-21 NOTE — PLAN OF CARE
Problem: Adult Inpatient Plan of Care  Goal: Optimal Comfort and Wellbeing  Outcome: Progressing  Intervention: Provide Person-Centered Care  Flowsheets (Taken 11/21/2024 6710)  Trust Relationship/Rapport:   care explained   choices provided   emotional support provided   empathic listening provided   questions answered   questions encouraged   reassurance provided   thoughts/feelings acknowledged

## 2024-11-22 ENCOUNTER — TELEPHONE (OUTPATIENT)
Dept: HEMATOLOGY/ONCOLOGY | Facility: CLINIC | Age: 79
End: 2024-11-22
Payer: MEDICARE

## 2024-11-22 NOTE — TELEPHONE ENCOUNTER
Spoke with pt's daughter to clarify and update upcoming appts.     ----- Message from [place] sent at 11/22/2024  9:44 AM CST -----  Regarding: Please call for scheduling issues....one appointment cancelled  Sangita[facility] the pt [facility]Kalani [facility]    Pt [phone]

## 2024-11-22 NOTE — TELEPHONE ENCOUNTER
Spoke with Jennifer at Lab. She is requesting signed orders for labs from provider.     Lab orders faxed.       ----- Message from [place] sent at 11/22/2024 10:22 AM CST -----  Type: Needs Medical Advice  Who Called:  Tomi with [facility] lab      Best Call Back Number: [phone]  Additional Information: requesting call back for doctors signature to draw pt labs

## 2024-11-25 ENCOUNTER — SOCIAL WORK (OUTPATIENT)
Dept: HEMATOLOGY/ONCOLOGY | Facility: CLINIC | Age: 79
End: 2024-11-25
Payer: MEDICARE

## 2024-11-25 ENCOUNTER — DOCUMENTATION ONLY (OUTPATIENT)
Facility: CLINIC | Age: 79
End: 2024-11-25
Payer: MEDICARE

## 2024-11-25 ENCOUNTER — INFUSION (OUTPATIENT)
Dept: INFUSION THERAPY | Facility: HOSPITAL | Age: 79
End: 2024-11-25
Attending: INTERNAL MEDICINE
Payer: MEDICARE

## 2024-11-25 VITALS
BODY MASS INDEX: 22.35 KG/M2 | TEMPERATURE: 98 F | SYSTOLIC BLOOD PRESSURE: 133 MMHG | OXYGEN SATURATION: 99 % | HEIGHT: 72 IN | DIASTOLIC BLOOD PRESSURE: 73 MMHG | RESPIRATION RATE: 18 BRPM | WEIGHT: 165 LBS | HEART RATE: 68 BPM

## 2024-11-25 DIAGNOSIS — C19 COLORECTAL CANCER: Primary | ICD-10-CM

## 2024-11-25 PROCEDURE — 96413 CHEMO IV INFUSION 1 HR: CPT

## 2024-11-25 PROCEDURE — A4216 STERILE WATER/SALINE, 10 ML: HCPCS | Performed by: INTERNAL MEDICINE

## 2024-11-25 PROCEDURE — 25000003 PHARM REV CODE 250: Performed by: INTERNAL MEDICINE

## 2024-11-25 PROCEDURE — 96366 THER/PROPH/DIAG IV INF ADDON: CPT

## 2024-11-25 PROCEDURE — 96416 CHEMO PROLONG INFUSE W/PUMP: CPT

## 2024-11-25 PROCEDURE — 96367 TX/PROPH/DG ADDL SEQ IV INF: CPT

## 2024-11-25 PROCEDURE — 63600175 PHARM REV CODE 636 W HCPCS: Performed by: INTERNAL MEDICINE

## 2024-11-25 RX ORDER — ONDANSETRON HCL IN 0.9 % NACL 8 MG/50 ML
8 INTRAVENOUS SOLUTION, PIGGYBACK (ML) INTRAVENOUS
Status: COMPLETED | OUTPATIENT
Start: 2024-11-25 | End: 2024-11-25

## 2024-11-25 RX ORDER — ONDANSETRON HYDROCHLORIDE 2 MG/ML
8 INJECTION, SOLUTION INTRAVENOUS
Status: CANCELLED | OUTPATIENT
Start: 2024-11-25

## 2024-11-25 RX ORDER — HEPARIN 100 UNIT/ML
500 SYRINGE INTRAVENOUS
Status: CANCELLED | OUTPATIENT
Start: 2024-11-25

## 2024-11-25 RX ORDER — EPINEPHRINE 0.3 MG/.3ML
0.3 INJECTION SUBCUTANEOUS ONCE AS NEEDED
Status: CANCELLED | OUTPATIENT
Start: 2024-11-25

## 2024-11-25 RX ORDER — SODIUM CHLORIDE 0.9 % (FLUSH) 0.9 %
10 SYRINGE (ML) INJECTION
Status: DISCONTINUED | OUTPATIENT
Start: 2024-11-25 | End: 2024-11-25 | Stop reason: HOSPADM

## 2024-11-25 RX ORDER — EPINEPHRINE 0.3 MG/.3ML
0.3 INJECTION SUBCUTANEOUS ONCE AS NEEDED
Status: DISCONTINUED | OUTPATIENT
Start: 2024-11-25 | End: 2024-11-25 | Stop reason: HOSPADM

## 2024-11-25 RX ORDER — SODIUM CHLORIDE 0.9 % (FLUSH) 0.9 %
10 SYRINGE (ML) INJECTION
Status: CANCELLED | OUTPATIENT
Start: 2024-11-25

## 2024-11-25 RX ORDER — DIPHENHYDRAMINE HYDROCHLORIDE 50 MG/ML
50 INJECTION INTRAMUSCULAR; INTRAVENOUS ONCE AS NEEDED
Status: CANCELLED | OUTPATIENT
Start: 2024-11-25

## 2024-11-25 RX ORDER — PROCHLORPERAZINE EDISYLATE 5 MG/ML
5 INJECTION INTRAMUSCULAR; INTRAVENOUS ONCE AS NEEDED
Status: CANCELLED | OUTPATIENT
Start: 2024-11-25

## 2024-11-25 RX ORDER — DIPHENHYDRAMINE HYDROCHLORIDE 50 MG/ML
50 INJECTION INTRAMUSCULAR; INTRAVENOUS ONCE AS NEEDED
Status: DISCONTINUED | OUTPATIENT
Start: 2024-11-25 | End: 2024-11-25 | Stop reason: HOSPADM

## 2024-11-25 RX ADMIN — Medication 10 ML: at 12:11

## 2024-11-25 RX ADMIN — SODIUM CHLORIDE: 9 INJECTION, SOLUTION INTRAVENOUS at 09:11

## 2024-11-25 RX ADMIN — SODIUM CHLORIDE 385 MG: 9 INJECTION, SOLUTION INTRAVENOUS at 09:11

## 2024-11-25 RX ADMIN — LEUCOVORIN CALCIUM 790 MG: 350 INJECTION, POWDER, LYOPHILIZED, FOR SOLUTION INTRAMUSCULAR; INTRAVENOUS at 10:11

## 2024-11-25 RX ADMIN — SODIUM CHLORIDE 8 MG: 9 INJECTION, SOLUTION INTRAVENOUS at 10:11

## 2024-11-25 RX ADMIN — FLUOROURACIL 2365 MG: 50 INJECTION, SOLUTION INTRAVENOUS at 12:11

## 2024-11-25 NOTE — PROGRESS NOTES
Edil Goff is a 79 year old diagnosed with colorectal cancer.  I met with patient and his wife during his chemo infusion to complete new patient orientation and to complete the NCCN Distress Screening; patient indicated a rating of 1.  Patient denied needing mental health support at this time.  I provided the couple with the numbers to [facility] and [facility].  I provided the couple with $50 in gas cards from the [employer] transportation sloane.  I provided patient with the [facility] community events flyer and my contact information in the event supportive services arise in the future.

## 2024-11-25 NOTE — PROGRESS NOTES
CHEMO SCHOOL- NUTRITION    Edil Goff is a 79 y.o. male with colorectal cancer. Pt will be receiving MVASI and 5FU. I met with Mr. Goff for chemo school today. Pt reports excellent appetite and intake. He denies any recent unintentional weight loss. He is working on increasing his hydration during therapy. He reports normal BMs. Denies N/V/D/C. Denies having any nutrition related questions or concerns at the current time.     CW: 165#.     Food Insecurity:  Patient is worried whether their food would run out before they have more money to buy more: Never  The food they bought just didn't last and they didn't have money to buy more: Never      Plan:   Reviewed chemo school packet & provided copy to pt.   Discussed importance of maintaining wt & staying hydrated.   Reviewed food safety guidelines recommended during treatment.   Reviewed alternate sources of hydration  Provided RD contact info & encouraged pt to call with any questions/concerns.   Will f/u as needed.       Electronically signed by: Charley Arriaza MBA, RDN, LDN

## 2024-11-25 NOTE — PLAN OF CARE
Problem: Fall Injury Risk  Goal: Absence of Fall and Fall-Related Injury  Outcome: Progressing  Intervention: Identify and Manage Contributors  Flowsheets (Taken 11/25/2024 0912)  Self-Care Promotion: independence encouraged  Medication Review/Management: medications reviewed  Intervention: Promote Injury-Free Environment  Flowsheets (Taken 11/25/2024 0912)  Safety Promotion/Fall Prevention: medications reviewed

## 2024-11-26 RX ORDER — PROCHLORPERAZINE EDISYLATE 5 MG/ML
5 INJECTION INTRAMUSCULAR; INTRAVENOUS ONCE AS NEEDED
Status: CANCELLED | OUTPATIENT
Start: 2024-11-26

## 2024-11-26 RX ORDER — HEPARIN 100 UNIT/ML
500 SYRINGE INTRAVENOUS
Status: CANCELLED | OUTPATIENT
Start: 2024-11-26

## 2024-11-26 RX ORDER — SODIUM CHLORIDE 0.9 % (FLUSH) 0.9 %
10 SYRINGE (ML) INJECTION
Status: CANCELLED | OUTPATIENT
Start: 2024-11-26

## 2024-11-27 ENCOUNTER — INFUSION (OUTPATIENT)
Dept: INFUSION THERAPY | Facility: HOSPITAL | Age: 79
End: 2024-11-27
Attending: INTERNAL MEDICINE
Payer: MEDICARE

## 2024-11-27 VITALS
RESPIRATION RATE: 18 BRPM | OXYGEN SATURATION: 100 % | DIASTOLIC BLOOD PRESSURE: 67 MMHG | HEIGHT: 72 IN | BODY MASS INDEX: 22.42 KG/M2 | HEART RATE: 63 BPM | SYSTOLIC BLOOD PRESSURE: 126 MMHG | TEMPERATURE: 97 F | WEIGHT: 165.5 LBS

## 2024-11-27 DIAGNOSIS — C19 COLORECTAL CANCER: Primary | ICD-10-CM

## 2024-11-27 PROCEDURE — 96523 IRRIG DRUG DELIVERY DEVICE: CPT

## 2024-11-27 PROCEDURE — 25000003 PHARM REV CODE 250: Performed by: INTERNAL MEDICINE

## 2024-11-27 PROCEDURE — A4216 STERILE WATER/SALINE, 10 ML: HCPCS | Performed by: INTERNAL MEDICINE

## 2024-11-27 PROCEDURE — 63600175 PHARM REV CODE 636 W HCPCS: Performed by: INTERNAL MEDICINE

## 2024-11-27 RX ORDER — SODIUM CHLORIDE 0.9 % (FLUSH) 0.9 %
10 SYRINGE (ML) INJECTION
Status: DISCONTINUED | OUTPATIENT
Start: 2024-11-27 | End: 2024-11-27 | Stop reason: HOSPADM

## 2024-11-27 RX ORDER — HEPARIN 100 UNIT/ML
500 SYRINGE INTRAVENOUS
Status: DISCONTINUED | OUTPATIENT
Start: 2024-11-27 | End: 2024-11-27 | Stop reason: HOSPADM

## 2024-11-27 RX ADMIN — SODIUM CHLORIDE, PRESERVATIVE FREE 10 ML: 5 INJECTION INTRAVENOUS at 10:11

## 2024-11-27 RX ADMIN — HEPARIN 500 UNITS: 100 SYRINGE at 10:11

## 2024-11-29 ENCOUNTER — TELEPHONE (OUTPATIENT)
Dept: HEMATOLOGY/ONCOLOGY | Facility: CLINIC | Age: 79
End: 2024-11-29
Payer: MEDICARE

## 2024-11-29 NOTE — TELEPHONE ENCOUNTER
----- Message from Aurash Khoobehi, MD sent at 11/29/2024  1:27 PM CST -----  Contact: kalani  They look good. He has some anemia but it is very mild. I can address that with him when he comes in, but it's nothing that should be causing any symptoms.  ----- Message -----  From: Rochelle Araujo RN  Sent: 11/29/2024  11:54 AM CST  To: Aurash Khoobehi, MD    Can you take a look at his labs from 11/22 and let me know if the results are okay?  ----- Message -----  From: Margot Yeung  Sent: 11/29/2024  11:30 AM CST  To: Khoobehi Aurash Staff    Type:  Needs Medical Advice    Who Called: Kalani   Symptoms (please be specific): caller is requesting Sangita to call her back regarding lab work.    Would the patient rather a call back or a response via [facility]? call  Best Call Back Number:   Additional Information: please advise and thank you.   Per Dr Faustina Lemon discontinued all medications not taken.

## 2024-11-29 NOTE — TELEPHONE ENCOUNTER
Patient's daughter made aware of above and verbalized understanding, reports that they weren't sure what date to get labs done next. Patient made aware to get labs done on 12/2/24 before their upcoming appointment. They verbalized understanding.

## 2024-12-03 ENCOUNTER — OFFICE VISIT (OUTPATIENT)
Dept: HEMATOLOGY/ONCOLOGY | Facility: CLINIC | Age: 79
End: 2024-12-03
Payer: MEDICARE

## 2024-12-03 DIAGNOSIS — C78.7 CANCER, METASTATIC TO LIVER: ICD-10-CM

## 2024-12-03 DIAGNOSIS — C19 COLORECTAL CANCER: Primary | ICD-10-CM

## 2024-12-03 DIAGNOSIS — C78.01 MALIGNANT NEOPLASM METASTATIC TO BOTH LUNGS: ICD-10-CM

## 2024-12-03 DIAGNOSIS — C78.02 MALIGNANT NEOPLASM METASTATIC TO BOTH LUNGS: ICD-10-CM

## 2024-12-03 PROCEDURE — 99215 OFFICE O/P EST HI 40 MIN: CPT | Mod: 95,,, | Performed by: INTERNAL MEDICINE

## 2024-12-03 NOTE — PROGRESS NOTES
The patient location is: home  Visit type: Virtual visit with synchronous audio and video  Face-to-face or time spent with patient on the encounter: 25 min  Total time spent on and for  this encounter which includes non face-to-face time preparing to see patient, review of tests, obtaining and or reviewing separately obtained records documenting clinical information in the electronic or other health records, independently interpreting results which is not separately reported ,and communicating results to the patient/family/caregiver and in care coordination and treatment planning/communicating with pharmacy for prescriptions/addressing social needs/arranging follow-up and or referrals : 25 min     Each patient I provide medical services by telemedicine is:  (1) informed of the relationship between the physician and patient and the respective role of any other health care provider with respect to management of the patient; and (2) notified that he or she may decline to receive medical services by telemedicine and may withdraw from such care at any time.  This is a video visit therefore some elements of the physical exam such as vital signs, heart sounds are breath sounds are not included and may be included if found in recent clinic notes of other providers assessing same patient. Any symptoms or signs that were visualized were stated by the patient may be included in this note.    Service Date:  12/3/24    Chief Complaint: colon cancer    Edil Goff is a 79 y.o. male here with metastatic colorectal cancer.  Patient has a history of right hemicolectomy with pathology showing a [id number] lesion.  Patient was then started on adjuvant FOLFOX, but had poor tolerance after 1 cycle.  The 5 FU bolus was dropped and oxaliplatin dose was lowered, but patient still continued to have poor tolerance had severe protein calorie malnutrition following chemotherapy.  Therefore it was decided to be held.  Patient then had 2 liver  lesions biopsied positive to be metastatic colon cancer.  He was then started on Vectibix with no improvement in the lesions, which were ultimately treated with SIRT.  He has no longer been on Vectibix for a few months.    Here for toxicity check after cycle 1.  Reports absolutely no side effects.  States he tolerated it well.    Review of Systems   Constitutional: Negative.  Negative for appetite change and unexpected weight change.   HENT: Negative.  Negative for mouth sores.    Eyes: Negative.  Negative for visual disturbance.   Respiratory: Negative.  Negative for cough and shortness of breath.    Cardiovascular: Negative.  Negative for chest pain.   Gastrointestinal: Negative.  Negative for abdominal pain and diarrhea.   Endocrine: Negative.    Genitourinary:  Positive for frequency.   Musculoskeletal: Negative.  Negative for back pain.   Integumentary:  Negative for rash. Negative.   Neurological: Negative.  Negative for headaches.   Hematological: Negative.  Negative for adenopathy.   Psychiatric/Behavioral:  The patient is nervous/anxious.         Current Outpatient Medications   Medication Instructions    amLODIPine (NORVASC) 5 mg, Every morning    amoxicillin-clavulanate 875-125mg (AUGMENTIN) 875-125 mg per tablet 1 tablet, Oral, 2 times daily    ascorbic acid (VITAMIN C ORAL) Every morning    aspirin 325 mg, Daily    buPROPion (WELLBUTRIN XL) 300 mg, Nightly    clonazePAM (KLONOPIN) 1 mg, Nightly PRN    ergocalciferol, vitamin D2, (VITAMIN D2 ORAL) Daily    EScitalopram oxalate (LEXAPRO) 10 mg, Every morning    ferrous sulfate (FEOSOL) 325 mg, Oral, Daily    LIDOcaine-prilocaine (EMLA) cream Topical (Top), As needed (PRN)    losartan (COZAAR) 50 mg, Every morning    ondansetron (ZOFRAN) 8 mg, Oral, Every 8 hours PRN    prochlorperazine (COMPAZINE) 10 mg, Oral, Every 6 hours PRN    rivastigmine tartrate (EXELON) 1.5 mg, 2 times daily    traZODone (DESYREL) 50 mg, Nightly        Past Medical History:    Diagnosis Date    Cancer     colon    Hyperlipidemia     Hypertension     TIA (transient ischemic attack)         Past Surgical History:   Procedure Laterality Date    COLON SURGERY  2023    resection    TONSILLECTOMY          No family history on file.    Social History     Tobacco Use    Smoking status: Never    Smokeless tobacco: Never   Substance Use Topics    Alcohol use: Never    Drug use: Never         There were no vitals filed for this visit.       Physical Exam:  There were no vitals taken for this visit.    Physical Exam  Vitals and nursing note reviewed.   Constitutional:       Appearance: Normal appearance.   HENT:      Head: Normocephalic and atraumatic.      Nose: Nose normal.      Mouth/Throat:      Mouth: Mucous membranes are moist.      Pharynx: Oropharynx is clear.   Eyes:      Extraocular Movements: Extraocular movements intact.      Conjunctiva/sclera: Conjunctivae normal.   Cardiovascular:      Rate and Rhythm: Normal rate and regular rhythm.      Heart sounds: Normal heart sounds.   Pulmonary:      Effort: Pulmonary effort is normal.      Breath sounds: Normal breath sounds.   Abdominal:      General: Abdomen is flat. Bowel sounds are normal.      Palpations: Abdomen is soft.   Musculoskeletal:         General: Normal range of motion.      Cervical back: Normal range of motion and neck supple.   Skin:     General: Skin is warm and dry.   Neurological:      General: No focal deficit present.      Mental Status: He is alert and oriented to person, place, and time. Mental status is at baseline.   Psychiatric:         Mood and Affect: Mood normal.          Labs:  Lab Results   Component Value Date    WBC 5.80 08/14/2024    RBC 4.00 (L) 08/14/2024    HGB 11.2 (L) 08/14/2024    HCT 36.1 (L) 08/14/2024    MCV 90 08/14/2024    MCH 28.0 08/14/2024    MCHC 31.0 (L) 08/14/2024    RDW 13.2 08/14/2024     08/14/2024    MPV 9.9 08/14/2024    GRAN 3.8 08/14/2024    GRAN 65.1 08/14/2024    LYMPH 1.2  08/14/2024    LYMPH 19.8 08/14/2024    MONO 0.7 08/14/2024    MONO 12.1 08/14/2024    EOS 0.2 08/14/2024    BASO 0.01 08/14/2024    EOSINOPHIL 2.6 08/14/2024    BASOPHIL 0.2 08/14/2024     Sodium   Date Value Ref Range Status   08/14/2024 139 136 - 145 mmol/L Final     Potassium   Date Value Ref Range Status   08/14/2024 4.3 3.5 - 5.1 mmol/L Final     Chloride   Date Value Ref Range Status   08/14/2024 106 95 - 110 mmol/L Final     CO2   Date Value Ref Range Status   08/14/2024 25 23 - 29 mmol/L Final     Glucose   Date Value Ref Range Status   08/14/2024 96 70 - 110 mg/dL Final     BUN   Date Value Ref Range Status   08/14/2024 19 8 - 23 mg/dL Final     Creatinine   Date Value Ref Range Status   08/14/2024 1.1 0.5 - 1.4 mg/dL Final     Calcium   Date Value Ref Range Status   08/14/2024 9.7 8.7 - 10.5 mg/dL Final     Total Protein   Date Value Ref Range Status   08/14/2024 6.4 6.0 - 8.4 g/dL Final     Albumin   Date Value Ref Range Status   08/14/2024 3.9 3.5 - 5.2 g/dL Final     Total Bilirubin   Date Value Ref Range Status   08/14/2024 0.8 0.1 - 1.0 mg/dL Final     Comment:     For infants and newborns, interpretation of results should be based  on gestational age, weight and in agreement with clinical  observations.    Premature Infant recommended reference ranges:  Up to 24 hours.............<8.0 mg/dL  Up to 48 hours............<12.0 mg/dL  3-5 days..................<15.0 mg/dL  6-29 days.................<15.0 mg/dL       Alkaline Phosphatase   Date Value Ref Range Status   08/14/2024 55 55 - 135 U/L Final     AST   Date Value Ref Range Status   08/14/2024 19 10 - 40 U/L Final     ALT   Date Value Ref Range Status   08/14/2024 13 10 - 44 U/L Final     Anion Gap   Date Value Ref Range Status   08/14/2024 8 8 - 16 mmol/L Final       A/P:    Metastatic colon adenocarcinoma   -oligometastatic with 2 lesions in the liver treated with SIRT  -after initial visit with me, CT chest was done which shows numerous lung  nodules suspicious for metastasis, not previously present  -pharmacogenomic shows that patient has a poor metabolizer of 5 FU, but after discussing with pharmacy, it can be given at 50% dosage  -tolerated cycle 1 well with no side effects according to him and his wife.  Patient is happy with the results.  He is okay for cycle 2.  I will see him back in 2-3 weeks for cycle 3 with labs     Iron deficiency   -will give IV iron      Aurash Khoobehi, MD  Hematology and Oncology

## 2024-12-03 NOTE — Clinical Note
Okay for treatment next week based on labs from yesterday.  Return to clinic prior to cycle 3 with labs.

## 2024-12-06 ENCOUNTER — TELEPHONE (OUTPATIENT)
Dept: HEMATOLOGY/ONCOLOGY | Facility: CLINIC | Age: 79
End: 2024-12-06
Payer: MEDICARE

## 2024-12-06 DIAGNOSIS — C78.7 CANCER, METASTATIC TO LIVER: ICD-10-CM

## 2024-12-06 DIAGNOSIS — C78.01 MALIGNANT NEOPLASM METASTATIC TO BOTH LUNGS: Primary | ICD-10-CM

## 2024-12-06 DIAGNOSIS — R53.83 CHEMOTHERAPY-INDUCED FATIGUE: ICD-10-CM

## 2024-12-06 DIAGNOSIS — C78.02 MALIGNANT NEOPLASM METASTATIC TO BOTH LUNGS: Primary | ICD-10-CM

## 2024-12-06 DIAGNOSIS — T45.1X5A CHEMOTHERAPY-INDUCED FATIGUE: ICD-10-CM

## 2024-12-06 RX ORDER — PROCHLORPERAZINE EDISYLATE 5 MG/ML
5 INJECTION INTRAMUSCULAR; INTRAVENOUS ONCE AS NEEDED
OUTPATIENT
Start: 2024-12-08

## 2024-12-06 RX ORDER — DIPHENHYDRAMINE HYDROCHLORIDE 50 MG/ML
50 INJECTION INTRAMUSCULAR; INTRAVENOUS ONCE AS NEEDED
OUTPATIENT
Start: 2024-12-08

## 2024-12-06 RX ORDER — ONDANSETRON HYDROCHLORIDE 2 MG/ML
8 INJECTION, SOLUTION INTRAVENOUS
OUTPATIENT
Start: 2024-12-08

## 2024-12-06 RX ORDER — SODIUM CHLORIDE 0.9 % (FLUSH) 0.9 %
10 SYRINGE (ML) INJECTION
OUTPATIENT
Start: 2024-12-08

## 2024-12-06 RX ORDER — PROCHLORPERAZINE EDISYLATE 5 MG/ML
5 INJECTION INTRAMUSCULAR; INTRAVENOUS ONCE AS NEEDED
OUTPATIENT
Start: 2024-12-10

## 2024-12-06 RX ORDER — HEPARIN 100 UNIT/ML
500 SYRINGE INTRAVENOUS
OUTPATIENT
Start: 2024-12-08

## 2024-12-06 RX ORDER — HEPARIN 100 UNIT/ML
500 SYRINGE INTRAVENOUS
OUTPATIENT
Start: 2024-12-10

## 2024-12-06 RX ORDER — EPINEPHRINE 0.3 MG/.3ML
0.3 INJECTION SUBCUTANEOUS ONCE AS NEEDED
OUTPATIENT
Start: 2024-12-08

## 2024-12-06 RX ORDER — SODIUM CHLORIDE 0.9 % (FLUSH) 0.9 %
10 SYRINGE (ML) INJECTION
OUTPATIENT
Start: 2024-12-10

## 2024-12-06 NOTE — TELEPHONE ENCOUNTER
"Spoke with pt's daughter. She states that pt does not seem to be having "too many side effects from the chemo except for some weakness when waking up at night to go to the bathroom." She is requesting a walker with wheels for pt.     ----- Message from Joie sent at 12/6/2024  3:34 PM CST -----  Pt's dtr Kalani is asking for orders for a walker. She would also like a call before she leaves today if possible     [phone]  "

## 2024-12-09 ENCOUNTER — INFUSION (OUTPATIENT)
Dept: INFUSION THERAPY | Facility: HOSPITAL | Age: 79
End: 2024-12-09
Attending: INTERNAL MEDICINE
Payer: MEDICARE

## 2024-12-09 VITALS
OXYGEN SATURATION: 100 % | BODY MASS INDEX: 22.94 KG/M2 | HEART RATE: 65 BPM | RESPIRATION RATE: 18 BRPM | DIASTOLIC BLOOD PRESSURE: 59 MMHG | WEIGHT: 169.38 LBS | HEIGHT: 72 IN | SYSTOLIC BLOOD PRESSURE: 121 MMHG | TEMPERATURE: 98 F

## 2024-12-09 DIAGNOSIS — C19 COLORECTAL CANCER: Primary | ICD-10-CM

## 2024-12-09 PROCEDURE — 96416 CHEMO PROLONG INFUSE W/PUMP: CPT

## 2024-12-09 PROCEDURE — 96413 CHEMO IV INFUSION 1 HR: CPT

## 2024-12-09 PROCEDURE — 96367 TX/PROPH/DG ADDL SEQ IV INF: CPT

## 2024-12-09 PROCEDURE — 96366 THER/PROPH/DIAG IV INF ADDON: CPT

## 2024-12-09 PROCEDURE — 25000003 PHARM REV CODE 250: Performed by: INTERNAL MEDICINE

## 2024-12-09 PROCEDURE — 63600175 PHARM REV CODE 636 W HCPCS: Performed by: INTERNAL MEDICINE

## 2024-12-09 RX ORDER — SODIUM CHLORIDE 0.9 % (FLUSH) 0.9 %
10 SYRINGE (ML) INJECTION
Status: DISCONTINUED | OUTPATIENT
Start: 2024-12-09 | End: 2024-12-09 | Stop reason: HOSPADM

## 2024-12-09 RX ORDER — PROCHLORPERAZINE EDISYLATE 5 MG/ML
5 INJECTION INTRAMUSCULAR; INTRAVENOUS ONCE AS NEEDED
Status: DISCONTINUED | OUTPATIENT
Start: 2024-12-09 | End: 2024-12-09 | Stop reason: HOSPADM

## 2024-12-09 RX ORDER — ONDANSETRON HCL IN 0.9 % NACL 8 MG/50 ML
8 INTRAVENOUS SOLUTION, PIGGYBACK (ML) INTRAVENOUS
Status: COMPLETED | OUTPATIENT
Start: 2024-12-09 | End: 2024-12-09

## 2024-12-09 RX ORDER — EPINEPHRINE 0.3 MG/.3ML
0.3 INJECTION SUBCUTANEOUS ONCE AS NEEDED
Status: DISCONTINUED | OUTPATIENT
Start: 2024-12-09 | End: 2024-12-09 | Stop reason: HOSPADM

## 2024-12-09 RX ORDER — DIPHENHYDRAMINE HYDROCHLORIDE 50 MG/ML
50 INJECTION INTRAMUSCULAR; INTRAVENOUS ONCE AS NEEDED
Status: DISCONTINUED | OUTPATIENT
Start: 2024-12-09 | End: 2024-12-09 | Stop reason: HOSPADM

## 2024-12-09 RX ADMIN — FLUOROURACIL 2365 MG: 50 INJECTION, SOLUTION INTRAVENOUS at 12:12

## 2024-12-09 RX ADMIN — LEUCOVORIN CALCIUM 790 MG: 350 INJECTION, POWDER, LYOPHILIZED, FOR SOLUTION INTRAMUSCULAR; INTRAVENOUS at 10:12

## 2024-12-09 RX ADMIN — SODIUM CHLORIDE 8 MG: 9 INJECTION, SOLUTION INTRAVENOUS at 09:12

## 2024-12-09 RX ADMIN — SODIUM CHLORIDE 385 MG: 9 INJECTION, SOLUTION INTRAVENOUS at 09:12

## 2024-12-09 NOTE — PLAN OF CARE
Problem: Fatigue  Goal: Improved Activity Tolerance  Intervention: Promote Improved Energy  Flowsheets (Taken 12/9/2024 9381)  Fatigue Management:   fatigue-related activity identified   paced activity encouraged   frequent rest breaks encouraged  Sleep/Rest Enhancement:   noise level reduced   relaxation techniques promoted   regular sleep/rest pattern promoted  Activity Management:   Ambulated -L4   Up in chair - L3  Environmental Support:   calm environment promoted   distractions minimized   rest periods encouraged

## 2024-12-11 ENCOUNTER — INFUSION (OUTPATIENT)
Dept: INFUSION THERAPY | Facility: HOSPITAL | Age: 79
End: 2024-12-11
Attending: INTERNAL MEDICINE
Payer: MEDICARE

## 2024-12-11 VITALS
TEMPERATURE: 98 F | BODY MASS INDEX: 23.46 KG/M2 | DIASTOLIC BLOOD PRESSURE: 74 MMHG | HEART RATE: 62 BPM | WEIGHT: 173.19 LBS | RESPIRATION RATE: 18 BRPM | OXYGEN SATURATION: 99 % | HEIGHT: 72 IN | SYSTOLIC BLOOD PRESSURE: 154 MMHG

## 2024-12-11 DIAGNOSIS — C19 COLORECTAL CANCER: Primary | ICD-10-CM

## 2024-12-11 PROCEDURE — 63600175 PHARM REV CODE 636 W HCPCS: Performed by: INTERNAL MEDICINE

## 2024-12-11 PROCEDURE — A4216 STERILE WATER/SALINE, 10 ML: HCPCS | Performed by: INTERNAL MEDICINE

## 2024-12-11 PROCEDURE — 96523 IRRIG DRUG DELIVERY DEVICE: CPT

## 2024-12-11 PROCEDURE — 25000003 PHARM REV CODE 250: Performed by: INTERNAL MEDICINE

## 2024-12-11 RX ORDER — SODIUM CHLORIDE 0.9 % (FLUSH) 0.9 %
10 SYRINGE (ML) INJECTION
Status: DISCONTINUED | OUTPATIENT
Start: 2024-12-11 | End: 2024-12-11 | Stop reason: HOSPADM

## 2024-12-11 RX ORDER — HEPARIN 100 UNIT/ML
500 SYRINGE INTRAVENOUS
Status: DISCONTINUED | OUTPATIENT
Start: 2024-12-11 | End: 2024-12-11 | Stop reason: HOSPADM

## 2024-12-11 RX ADMIN — SODIUM CHLORIDE, PRESERVATIVE FREE 10 ML: 5 INJECTION INTRAVENOUS at 10:12

## 2024-12-11 RX ADMIN — HEPARIN 500 UNITS: 100 SYRINGE at 10:12

## 2024-12-12 ENCOUNTER — PATIENT MESSAGE (OUTPATIENT)
Dept: HEMATOLOGY/ONCOLOGY | Facility: CLINIC | Age: 79
End: 2024-12-12
Payer: MEDICARE

## 2024-12-12 ENCOUNTER — TELEPHONE (OUTPATIENT)
Dept: HEMATOLOGY/ONCOLOGY | Facility: CLINIC | Age: 79
End: 2024-12-12
Payer: MEDICARE

## 2024-12-12 NOTE — TELEPHONE ENCOUNTER
----- Message from Ragini sent at 12/12/2024  9:20 AM CST -----    ----- Message -----  From: Taylor Naylor  Sent: 12/12/2024   9:15 AM CST  To: Suzie Christianson Staff    Ms. Alanna ramirez/ [facility] called to let the provider know they are not in network with Pt's insurance.     # [phone] ext [phone]

## 2024-12-16 ENCOUNTER — PATIENT MESSAGE (OUTPATIENT)
Dept: HEMATOLOGY/ONCOLOGY | Facility: CLINIC | Age: 79
End: 2024-12-16
Payer: MEDICARE

## 2024-12-18 ENCOUNTER — HOSPITAL ENCOUNTER (OUTPATIENT)
Dept: RADIOLOGY | Facility: HOSPITAL | Age: 79
Discharge: HOME | End: 2024-12-18
Attending: FAMILY MEDICINE
Payer: MEDICARE

## 2024-12-18 ENCOUNTER — OFFICE VISIT (OUTPATIENT)
Dept: HEMATOLOGY/ONCOLOGY | Facility: CLINIC | Age: 79
End: 2024-12-18
Payer: MEDICARE

## 2024-12-18 ENCOUNTER — PATIENT MESSAGE (OUTPATIENT)
Dept: HEMATOLOGY/ONCOLOGY | Facility: CLINIC | Age: 79
End: 2024-12-18

## 2024-12-18 ENCOUNTER — PATIENT MESSAGE (OUTPATIENT)
Dept: INTERVENTIONAL RADIOLOGY/VASCULAR | Facility: CLINIC | Age: 79
End: 2024-12-18
Payer: MEDICARE

## 2024-12-18 VITALS
BODY MASS INDEX: 22.33 KG/M2 | WEIGHT: 164.88 LBS | TEMPERATURE: 98 F | OXYGEN SATURATION: 100 % | RESPIRATION RATE: 18 BRPM | HEIGHT: 72 IN | SYSTOLIC BLOOD PRESSURE: 140 MMHG | HEART RATE: 65 BPM | DIASTOLIC BLOOD PRESSURE: 63 MMHG

## 2024-12-18 DIAGNOSIS — C78.7 CANCER, METASTATIC TO LIVER: ICD-10-CM

## 2024-12-18 DIAGNOSIS — C19 COLORECTAL CANCER: ICD-10-CM

## 2024-12-18 DIAGNOSIS — C78.01 MALIGNANT NEOPLASM METASTATIC TO BOTH LUNGS: ICD-10-CM

## 2024-12-18 DIAGNOSIS — C78.02 MALIGNANT NEOPLASM METASTATIC TO BOTH LUNGS: ICD-10-CM

## 2024-12-18 DIAGNOSIS — C78.01 MALIGNANT NEOPLASM METASTATIC TO BOTH LUNGS: Primary | ICD-10-CM

## 2024-12-18 DIAGNOSIS — R63.0 LOSS OF APPETITE: ICD-10-CM

## 2024-12-18 DIAGNOSIS — C78.02 MALIGNANT NEOPLASM METASTATIC TO BOTH LUNGS: Primary | ICD-10-CM

## 2024-12-18 PROCEDURE — 3078F DIAST BP <80 MM HG: CPT | Mod: CPTII,S$GLB,, | Performed by: INTERNAL MEDICINE

## 2024-12-18 PROCEDURE — 1101F PT FALLS ASSESS-DOCD LE1/YR: CPT | Mod: CPTII,S$GLB,, | Performed by: INTERNAL MEDICINE

## 2024-12-18 PROCEDURE — 3288F FALL RISK ASSESSMENT DOCD: CPT | Mod: CPTII,S$GLB,, | Performed by: INTERNAL MEDICINE

## 2024-12-18 PROCEDURE — 3077F SYST BP >= 140 MM HG: CPT | Mod: CPTII,S$GLB,, | Performed by: INTERNAL MEDICINE

## 2024-12-18 PROCEDURE — 99214 OFFICE O/P EST MOD 30 MIN: CPT | Mod: S$GLB,,, | Performed by: INTERNAL MEDICINE

## 2024-12-18 PROCEDURE — 1159F MED LIST DOCD IN RCRD: CPT | Mod: CPTII,S$GLB,, | Performed by: INTERNAL MEDICINE

## 2024-12-18 PROCEDURE — 1126F AMNT PAIN NOTED NONE PRSNT: CPT | Mod: CPTII,S$GLB,, | Performed by: INTERNAL MEDICINE

## 2024-12-18 PROCEDURE — 99999 PR PBB SHADOW E&M-EST. PATIENT-LVL V: CPT | Mod: PBBFAC,,, | Performed by: INTERNAL MEDICINE

## 2024-12-18 PROCEDURE — 1160F RVW MEDS BY RX/DR IN RCRD: CPT | Mod: CPTII,S$GLB,, | Performed by: INTERNAL MEDICINE

## 2024-12-18 RX ORDER — DRONABINOL 2.5 MG/1
2.5 CAPSULE ORAL
Qty: 60 CAPSULE | Refills: 0 | Status: SHIPPED | OUTPATIENT
Start: 2024-12-18 | End: 2024-12-18 | Stop reason: SDUPTHER

## 2024-12-18 RX ORDER — DRONABINOL 2.5 MG/1
2.5 CAPSULE ORAL
Qty: 60 CAPSULE | Refills: 0 | Status: SHIPPED | OUTPATIENT
Start: 2024-12-18

## 2024-12-18 NOTE — PROGRESS NOTES
The patient location is: home  Visit type: Virtual visit with synchronous audio and video  Face-to-face or time spent with patient on the encounter: 25 min  Total time spent on and for  this encounter which includes non face-to-face time preparing to see patient, review of tests, obtaining and or reviewing separately obtained records documenting clinical information in the electronic or other health records, independently interpreting results which is not separately reported ,and communicating results to the patient/family/caregiver and in care coordination and treatment planning/communicating with pharmacy for prescriptions/addressing social needs/arranging follow-up and or referrals : 25 min     Each patient I provide medical services by telemedicine is:  (1) informed of the relationship between the physician and patient and the respective role of any other health care provider with respect to management of the patient; and (2) notified that he or she may decline to receive medical services by telemedicine and may withdraw from such care at any time.  This is a video visit therefore some elements of the physical exam such as vital signs, heart sounds are breath sounds are not included and may be included if found in recent clinic notes of other providers assessing same patient. Any symptoms or signs that were visualized were stated by the patient may be included in this note.    Service Date:  12/18/24    Chief Complaint: colon cancer    Edil Goff is a 79 y.o. male here with metastatic colorectal cancer.  Patient has a history of right hemicolectomy with pathology showing a [id number] lesion.  Patient was then started on adjuvant FOLFOX, but had poor tolerance after 1 cycle.  The 5 FU bolus was dropped and oxaliplatin dose was lowered, but patient still continued to have poor tolerance had severe protein calorie malnutrition following chemotherapy.  Therefore it was decided to be held.  Patient then had 2 liver  lesions biopsied positive to be metastatic colon cancer.  He was then started on Vectibix with no improvement in the lesions, which were ultimately treated with SIRT.  He has no longer been on Vectibix for a few months.    Here prior to cycle 3 sooner than scheduled due to loss of appetite.  Patient's family reports that patient is only taking a couple of bites of food.  Tries to eat 4-6 small meals per day.  Does prefer to have ice cream.  Denies any nausea or vomiting.  Just states that he does not feel hungry.    Review of Systems   Constitutional: Negative.  Negative for appetite change and unexpected weight change.   HENT: Negative.  Negative for mouth sores.    Eyes: Negative.  Negative for visual disturbance.   Respiratory: Negative.  Negative for cough and shortness of breath.    Cardiovascular: Negative.  Negative for chest pain.   Gastrointestinal: Negative.  Negative for abdominal pain and diarrhea.   Endocrine: Negative.    Genitourinary:  Positive for frequency.   Musculoskeletal: Negative.  Negative for back pain.   Integumentary:  Negative for rash. Negative.   Neurological: Negative.  Negative for headaches.   Hematological: Negative.  Negative for adenopathy.   Psychiatric/Behavioral:  The patient is nervous/anxious.         Current Outpatient Medications   Medication Instructions    amLODIPine (NORVASC) 5 mg, Every morning    amoxicillin-clavulanate 875-125mg (AUGMENTIN) 875-125 mg per tablet 1 tablet, Oral, 2 times daily    ascorbic acid (VITAMIN C ORAL) Every morning    aspirin 325 mg, Daily    buPROPion (WELLBUTRIN XL) 300 mg, Nightly    clonazePAM (KLONOPIN) 1 mg, Nightly PRN    droNABinol (MARINOL) 2.5 mg, Oral, 2 times daily before meals, Take 1 hour before lunch and dinner    ergocalciferol, vitamin D2, (VITAMIN D2 ORAL) Daily    EScitalopram oxalate (LEXAPRO) 10 mg, Every morning    ferrous sulfate (FEOSOL) 325 mg, Oral, Daily    LIDOcaine-prilocaine (EMLA) cream Topical (Top), As needed  (PRN)    losartan (COZAAR) 50 mg, Every morning    ondansetron (ZOFRAN) 8 mg, Oral, Every 8 hours PRN    prochlorperazine (COMPAZINE) 10 mg, Oral, Every 6 hours PRN    rivastigmine tartrate (EXELON) 1.5 mg, 2 times daily    traZODone (DESYREL) 50 mg, Nightly        Past Medical History:   Diagnosis Date    Cancer     colon    Hyperlipidemia     Hypertension     TIA (transient ischemic attack)         Past Surgical History:   Procedure Laterality Date    COLON SURGERY  2023    resection    TONSILLECTOMY          No family history on file.    Social History     Tobacco Use    Smoking status: Never    Smokeless tobacco: Never   Substance Use Topics    Alcohol use: Never    Drug use: Never         Vitals:    12/18/24 0946   BP: (!) 140/63   Pulse: 65   Resp: 18   Temp: 97.9 °F (36.6 °C)          Physical Exam:  BP (!) 140/63 (BP Location: Left arm, Patient Position: Sitting)   Pulse 65   Temp 97.9 °F (36.6 °C) (Temporal)   Resp 18   Ht 6' (1.829 m)   Wt 74.8 kg (164 lb 14.5 oz)   SpO2 100%   BMI 22.37 kg/m²     Physical Exam  Vitals and nursing note reviewed.   Constitutional:       Appearance: Normal appearance.   HENT:      Head: Normocephalic and atraumatic.      Nose: Nose normal.      Mouth/Throat:      Mouth: Mucous membranes are moist.      Pharynx: Oropharynx is clear.   Eyes:      Extraocular Movements: Extraocular movements intact.      Conjunctiva/sclera: Conjunctivae normal.   Cardiovascular:      Rate and Rhythm: Normal rate and regular rhythm.      Heart sounds: Normal heart sounds.   Pulmonary:      Effort: Pulmonary effort is normal.      Breath sounds: Normal breath sounds.   Abdominal:      General: Abdomen is flat. Bowel sounds are normal.      Palpations: Abdomen is soft.   Musculoskeletal:         General: Normal range of motion.      Cervical back: Normal range of motion and neck supple.   Skin:     General: Skin is warm and dry.   Neurological:      General: No focal deficit present.       Mental Status: He is alert and oriented to person, place, and time. Mental status is at baseline.   Psychiatric:         Mood and Affect: Mood normal.          Labs:  Lab Results   Component Value Date    WBC 5.80 08/14/2024    RBC 4.00 (L) 08/14/2024    HGB 11.2 (L) 08/14/2024    HCT 36.1 (L) 08/14/2024    MCV 90 08/14/2024    MCH 28.0 08/14/2024    MCHC 31.0 (L) 08/14/2024    RDW 13.2 08/14/2024     08/14/2024    MPV 9.9 08/14/2024    GRAN 3.8 08/14/2024    GRAN 65.1 08/14/2024    LYMPH 1.2 08/14/2024    LYMPH 19.8 08/14/2024    MONO 0.7 08/14/2024    MONO 12.1 08/14/2024    EOS 0.2 08/14/2024    BASO 0.01 08/14/2024    EOSINOPHIL 2.6 08/14/2024    BASOPHIL 0.2 08/14/2024     Sodium   Date Value Ref Range Status   08/14/2024 139 136 - 145 mmol/L Final     Potassium   Date Value Ref Range Status   08/14/2024 4.3 3.5 - 5.1 mmol/L Final     Chloride   Date Value Ref Range Status   08/14/2024 106 95 - 110 mmol/L Final     CO2   Date Value Ref Range Status   08/14/2024 25 23 - 29 mmol/L Final     Glucose   Date Value Ref Range Status   08/14/2024 96 70 - 110 mg/dL Final     BUN   Date Value Ref Range Status   08/14/2024 19 8 - 23 mg/dL Final     Creatinine   Date Value Ref Range Status   08/14/2024 1.1 0.5 - 1.4 mg/dL Final     Calcium   Date Value Ref Range Status   08/14/2024 9.7 8.7 - 10.5 mg/dL Final     Total Protein   Date Value Ref Range Status   08/14/2024 6.4 6.0 - 8.4 g/dL Final     Albumin   Date Value Ref Range Status   08/14/2024 3.9 3.5 - 5.2 g/dL Final     Total Bilirubin   Date Value Ref Range Status   08/14/2024 0.8 0.1 - 1.0 mg/dL Final     Comment:     For infants and newborns, interpretation of results should be based  on gestational age, weight and in agreement with clinical  observations.    Premature Infant recommended reference ranges:  Up to 24 hours.............<8.0 mg/dL  Up to 48 hours............<12.0 mg/dL  3-5 days..................<15.0 mg/dL  6-29 days.................<15.0  mg/dL       Alkaline Phosphatase   Date Value Ref Range Status   08/14/2024 55 55 - 135 U/L Final     AST   Date Value Ref Range Status   08/14/2024 19 10 - 40 U/L Final     ALT   Date Value Ref Range Status   08/14/2024 13 10 - 44 U/L Final     Anion Gap   Date Value Ref Range Status   08/14/2024 8 8 - 16 mmol/L Final       A/P:    Loss of appetite   -patient is trying to eat small meals about 4-6 per day   -I encouraged him to try high calorie foods   -I will give a prescription for dronabinol    Metastatic colon adenocarcinoma   -oligometastatic with 2 lesions in the liver treated with SIRT  -after initial visit with me, CT chest was done which shows numerous lung nodules suspicious for metastasis, not previously present  -pharmacogenomic shows that patient has a poor metabolizer of 5 FU, but after discussing with pharmacy, it can be given at 50% dosage  -to clinic for cycle 3 of treatment    Iron deficiency   -will give IV iron when ready      Aurash Khoobehi, MD  Hematology and Oncology

## 2024-12-19 ENCOUNTER — TELEPHONE (OUTPATIENT)
Dept: INTERVENTIONAL RADIOLOGY/VASCULAR | Facility: CLINIC | Age: 79
End: 2024-12-19
Payer: MEDICARE

## 2024-12-19 DIAGNOSIS — C78.7 CANCER, METASTATIC TO LIVER: Primary | ICD-10-CM

## 2024-12-19 DIAGNOSIS — C19 COLORECTAL CANCER: ICD-10-CM

## 2024-12-27 ENCOUNTER — HOSPITAL ENCOUNTER (OUTPATIENT)
Dept: RADIOLOGY | Facility: HOSPITAL | Age: 79
Discharge: HOME | End: 2024-12-27
Attending: FAMILY MEDICINE
Payer: MEDICARE

## 2024-12-27 DIAGNOSIS — C19 COLORECTAL CANCER: ICD-10-CM

## 2024-12-27 DIAGNOSIS — C78.7 CANCER, METASTATIC TO LIVER: ICD-10-CM

## 2024-12-27 PROCEDURE — 74170 CT ABD WO CNTRST FLWD CNTRST: CPT | Mod: 26,,, | Performed by: RADIOLOGY

## 2024-12-27 PROCEDURE — 25500020 PHARM REV CODE 255: Mod: PO | Performed by: FAMILY MEDICINE

## 2024-12-27 PROCEDURE — 74170 CT ABD WO CNTRST FLWD CNTRST: CPT | Mod: TC,PO

## 2024-12-27 RX ADMIN — IOHEXOL 100 ML: 350 INJECTION, SOLUTION INTRAVENOUS at 03:12

## 2024-12-30 ENCOUNTER — OFFICE VISIT (OUTPATIENT)
Dept: HEMATOLOGY/ONCOLOGY | Facility: CLINIC | Age: 79
End: 2024-12-30
Payer: MEDICARE

## 2024-12-30 DIAGNOSIS — M89.9 BONE LESION: ICD-10-CM

## 2024-12-30 DIAGNOSIS — C78.01 MALIGNANT NEOPLASM METASTATIC TO BOTH LUNGS: ICD-10-CM

## 2024-12-30 DIAGNOSIS — R53.83 CHEMOTHERAPY-INDUCED FATIGUE: ICD-10-CM

## 2024-12-30 DIAGNOSIS — C78.7 CANCER, METASTATIC TO LIVER: ICD-10-CM

## 2024-12-30 DIAGNOSIS — R63.0 LOSS OF APPETITE: ICD-10-CM

## 2024-12-30 DIAGNOSIS — C78.02 MALIGNANT NEOPLASM METASTATIC TO BOTH LUNGS: ICD-10-CM

## 2024-12-30 DIAGNOSIS — C19 COLORECTAL CANCER: Primary | ICD-10-CM

## 2024-12-30 DIAGNOSIS — T45.1X5A CHEMOTHERAPY-INDUCED FATIGUE: ICD-10-CM

## 2024-12-30 PROCEDURE — 99215 OFFICE O/P EST HI 40 MIN: CPT | Mod: 95,,, | Performed by: INTERNAL MEDICINE

## 2024-12-30 PROCEDURE — G2211 COMPLEX E/M VISIT ADD ON: HCPCS | Mod: 95,,, | Performed by: INTERNAL MEDICINE

## 2024-12-30 RX ORDER — ONDANSETRON HYDROCHLORIDE 2 MG/ML
8 INJECTION, SOLUTION INTRAVENOUS
Status: CANCELLED | OUTPATIENT
Start: 2024-12-30

## 2024-12-30 RX ORDER — HEPARIN 100 UNIT/ML
500 SYRINGE INTRAVENOUS
Status: CANCELLED | OUTPATIENT
Start: 2025-01-01

## 2024-12-30 RX ORDER — PROCHLORPERAZINE EDISYLATE 5 MG/ML
5 INJECTION INTRAMUSCULAR; INTRAVENOUS ONCE AS NEEDED
Status: CANCELLED | OUTPATIENT
Start: 2024-12-30

## 2024-12-30 RX ORDER — EPINEPHRINE 0.3 MG/.3ML
0.3 INJECTION SUBCUTANEOUS ONCE AS NEEDED
Status: CANCELLED | OUTPATIENT
Start: 2024-12-30

## 2024-12-30 RX ORDER — SODIUM CHLORIDE 0.9 % (FLUSH) 0.9 %
10 SYRINGE (ML) INJECTION
Status: CANCELLED | OUTPATIENT
Start: 2024-12-30

## 2024-12-30 RX ORDER — DIPHENHYDRAMINE HYDROCHLORIDE 50 MG/ML
50 INJECTION INTRAMUSCULAR; INTRAVENOUS ONCE AS NEEDED
Status: CANCELLED | OUTPATIENT
Start: 2024-12-30

## 2024-12-30 RX ORDER — PROCHLORPERAZINE EDISYLATE 5 MG/ML
5 INJECTION INTRAMUSCULAR; INTRAVENOUS ONCE AS NEEDED
OUTPATIENT
Start: 2025-01-01

## 2024-12-30 RX ORDER — SODIUM CHLORIDE 0.9 % (FLUSH) 0.9 %
10 SYRINGE (ML) INJECTION
Status: CANCELLED | OUTPATIENT
Start: 2025-01-01

## 2024-12-30 RX ORDER — HEPARIN 100 UNIT/ML
500 SYRINGE INTRAVENOUS
Status: CANCELLED | OUTPATIENT
Start: 2024-12-30

## 2024-12-30 NOTE — PROGRESS NOTES
The patient location is: home  Visit type: Virtual visit with synchronous audio and video  Face-to-face or time spent with patient on the encounter: 25 min  Total time spent on and for  this encounter which includes non face-to-face time preparing to see patient, review of tests, obtaining and or reviewing separately obtained records documenting clinical information in the electronic or other health records, independently interpreting results which is not separately reported ,and communicating results to the patient/family/caregiver and in care coordination and treatment planning/communicating with pharmacy for prescriptions/addressing social needs/arranging follow-up and or referrals : 25 min     Each patient I provide medical services by telemedicine is:  (1) informed of the relationship between the physician and patient and the respective role of any other health care provider with respect to management of the patient; and (2) notified that he or she may decline to receive medical services by telemedicine and may withdraw from such care at any time.  This is a video visit therefore some elements of the physical exam such as vital signs, heart sounds are breath sounds are not included and may be included if found in recent clinic notes of other providers assessing same patient. Any symptoms or signs that were visualized were stated by the patient may be included in this note.    Service Date:  12/30/24    Chief Complaint: colon cancer    Edil Goff is a 79 y.o. male here with metastatic colorectal cancer.  Patient has a history of right hemicolectomy with pathology showing a [id number] lesion.  Patient was then started on adjuvant FOLFOX, but had poor tolerance after 1 cycle.  The 5 FU bolus was dropped and oxaliplatin dose was lowered, but patient still continued to have poor tolerance had severe protein calorie malnutrition following chemotherapy.  Therefore it was decided to be held.  Patient then had 2 liver  lesions biopsied positive to be metastatic colon cancer.  He was then started on Vectibix with no improvement in the lesions, which were ultimately treated with SIRT.  He has no longer been on Vectibix for a few months.    Here prior to cycle 3 sooner than scheduled due to loss of appetite.  Patient's family reports that patient is only taking a couple of bites of food.  Tries to eat 4-6 small meals per day.  Does prefer to have ice cream.  Denies any nausea or vomiting.  Just states that he does not feel hungry.    Review of Systems   Constitutional: Negative.  Negative for appetite change and unexpected weight change.   HENT: Negative.  Negative for mouth sores.    Eyes: Negative.  Negative for visual disturbance.   Respiratory: Negative.  Negative for cough and shortness of breath.    Cardiovascular: Negative.  Negative for chest pain.   Gastrointestinal: Negative.  Negative for abdominal pain and diarrhea.   Endocrine: Negative.    Genitourinary:  Positive for frequency.   Musculoskeletal: Negative.  Negative for back pain.   Integumentary:  Negative for rash. Negative.   Neurological: Negative.  Negative for headaches.   Hematological: Negative.  Negative for adenopathy.   Psychiatric/Behavioral:  The patient is nervous/anxious.         Current Outpatient Medications   Medication Instructions    amLODIPine (NORVASC) 5 mg, Every morning    amoxicillin-clavulanate 875-125mg (AUGMENTIN) 875-125 mg per tablet 1 tablet, Oral, 2 times daily    ascorbic acid (VITAMIN C ORAL) Every morning    aspirin 325 mg, Daily    buPROPion (WELLBUTRIN XL) 300 mg, Nightly    clonazePAM (KLONOPIN) 1 mg, Nightly PRN    droNABinol (MARINOL) 2.5 mg, Oral, 2 times daily before meals, Take 1 hour before lunch and dinner    ergocalciferol, vitamin D2, (VITAMIN D2 ORAL) Daily    EScitalopram oxalate (LEXAPRO) 10 mg, Every morning    ferrous sulfate (FEOSOL) 325 mg, Oral, Daily    LIDOcaine-prilocaine (EMLA) cream Topical (Top), As needed  (PRN)    losartan (COZAAR) 50 mg, Every morning    ondansetron (ZOFRAN) 8 mg, Oral, Every 8 hours PRN    prochlorperazine (COMPAZINE) 10 mg, Oral, Every 6 hours PRN    rivastigmine tartrate (EXELON) 1.5 mg, 2 times daily    traZODone (DESYREL) 50 mg, Nightly        Past Medical History:   Diagnosis Date    Cancer     colon    Hyperlipidemia     Hypertension     TIA (transient ischemic attack)         Past Surgical History:   Procedure Laterality Date    COLON SURGERY  2023    resection    TONSILLECTOMY          No family history on file.    Social History     Tobacco Use    Smoking status: Never    Smokeless tobacco: Never   Substance Use Topics    Alcohol use: Never    Drug use: Never         There were no vitals filed for this visit.         Physical Exam:  There were no vitals taken for this visit.    Physical Exam  Vitals and nursing note reviewed.   Constitutional:       Appearance: Normal appearance.   HENT:      Head: Normocephalic and atraumatic.      Nose: Nose normal.      Mouth/Throat:      Mouth: Mucous membranes are moist.      Pharynx: Oropharynx is clear.   Eyes:      Extraocular Movements: Extraocular movements intact.      Conjunctiva/sclera: Conjunctivae normal.   Cardiovascular:      Rate and Rhythm: Normal rate and regular rhythm.      Heart sounds: Normal heart sounds.   Pulmonary:      Effort: Pulmonary effort is normal.      Breath sounds: Normal breath sounds.   Abdominal:      General: Abdomen is flat. Bowel sounds are normal.      Palpations: Abdomen is soft.   Musculoskeletal:         General: Normal range of motion.      Cervical back: Normal range of motion and neck supple.   Skin:     General: Skin is warm and dry.   Neurological:      General: No focal deficit present.      Mental Status: He is alert and oriented to person, place, and time. Mental status is at baseline.   Psychiatric:         Mood and Affect: Mood normal.          Labs:  Lab Results   Component Value Date    WBC 5.80  08/14/2024    RBC 4.00 (L) 08/14/2024    HGB 11.2 (L) 08/14/2024    HCT 36.1 (L) 08/14/2024    MCV 90 08/14/2024    MCH 28.0 08/14/2024    MCHC 31.0 (L) 08/14/2024    RDW 13.2 08/14/2024     08/14/2024    MPV 9.9 08/14/2024    GRAN 3.8 08/14/2024    GRAN 65.1 08/14/2024    LYMPH 1.2 08/14/2024    LYMPH 19.8 08/14/2024    MONO 0.7 08/14/2024    MONO 12.1 08/14/2024    EOS 0.2 08/14/2024    BASO 0.01 08/14/2024    EOSINOPHIL 2.6 08/14/2024    BASOPHIL 0.2 08/14/2024     Sodium   Date Value Ref Range Status   08/14/2024 139 136 - 145 mmol/L Final     Potassium   Date Value Ref Range Status   08/14/2024 4.3 3.5 - 5.1 mmol/L Final     Chloride   Date Value Ref Range Status   08/14/2024 106 95 - 110 mmol/L Final     CO2   Date Value Ref Range Status   08/14/2024 25 23 - 29 mmol/L Final     Glucose   Date Value Ref Range Status   08/14/2024 96 70 - 110 mg/dL Final     BUN   Date Value Ref Range Status   08/14/2024 19 8 - 23 mg/dL Final     Creatinine   Date Value Ref Range Status   08/14/2024 1.1 0.5 - 1.4 mg/dL Final     Calcium   Date Value Ref Range Status   08/14/2024 9.7 8.7 - 10.5 mg/dL Final     Total Protein   Date Value Ref Range Status   08/14/2024 6.4 6.0 - 8.4 g/dL Final     Albumin   Date Value Ref Range Status   08/14/2024 3.9 3.5 - 5.2 g/dL Final     Total Bilirubin   Date Value Ref Range Status   08/14/2024 0.8 0.1 - 1.0 mg/dL Final     Comment:     For infants and newborns, interpretation of results should be based  on gestational age, weight and in agreement with clinical  observations.    Premature Infant recommended reference ranges:  Up to 24 hours.............<8.0 mg/dL  Up to 48 hours............<12.0 mg/dL  3-5 days..................<15.0 mg/dL  6-29 days.................<15.0 mg/dL       Alkaline Phosphatase   Date Value Ref Range Status   08/14/2024 55 55 - 135 U/L Final     AST   Date Value Ref Range Status   08/14/2024 19 10 - 40 U/L Final     ALT   Date Value Ref Range Status    08/14/2024 13 10 - 44 U/L Final     Anion Gap   Date Value Ref Range Status   08/14/2024 8 8 - 16 mmol/L Final       A/P:    Loss of appetite   -has not yet started dronabinol due to the cost.  But able to get it for cheaper today.  Try and if no help will try prednisone.    Metastatic colon adenocarcinoma   -oligometastatic with 2 lesions in the liver treated with SIRT  -after initial visit with me, CT chest was done which shows numerous lung nodules suspicious for metastasis, not previously present  -pharmacogenomic shows that patient has a poor metabolizer of 5 FU, but after discussing with pharmacy, it can be given at 50% dosage  -to clinic for cycle 4 of treatment    Iron deficiency anemia   -improved with IV iron  -continue to monitor     Bone lesion  -seen on CT abdomen  -lesion on the L2 lumbar spine   -plan to repeat another scan in the future, with MRI.  We will need sedation prior.      Aurash Khoobehi, MD  Hematology and Oncology    Visit today included increased complexity associated with the care of the episodic problem colon cancer addressed and managing the longitudinal care of the patient due to the serious and/or complex managed problem(s).

## 2024-12-31 ENCOUNTER — DOCUMENTATION ONLY (OUTPATIENT)
Dept: NUTRITION | Facility: HOSPITAL | Age: 79
End: 2024-12-31

## 2024-12-31 ENCOUNTER — INFUSION (OUTPATIENT)
Dept: INFUSION THERAPY | Facility: HOSPITAL | Age: 79
End: 2024-12-31
Attending: INTERNAL MEDICINE
Payer: MEDICARE

## 2024-12-31 VITALS
HEART RATE: 65 BPM | WEIGHT: 162.5 LBS | HEIGHT: 72 IN | RESPIRATION RATE: 16 BRPM | BODY MASS INDEX: 22.01 KG/M2 | DIASTOLIC BLOOD PRESSURE: 73 MMHG | SYSTOLIC BLOOD PRESSURE: 129 MMHG | OXYGEN SATURATION: 98 % | TEMPERATURE: 97 F

## 2024-12-31 DIAGNOSIS — C19 COLORECTAL CANCER: Primary | ICD-10-CM

## 2024-12-31 LAB
BILIRUB UR QL STRIP: NEGATIVE
CLARITY UR: CLEAR
COLOR UR: YELLOW
GLUCOSE UR QL STRIP: NEGATIVE
HGB UR QL STRIP: NEGATIVE
KETONES UR QL STRIP: NEGATIVE
LEUKOCYTE ESTERASE UR QL STRIP: NEGATIVE
NITRITE UR QL STRIP: NEGATIVE
PH UR STRIP: 6 [PH] (ref 5–8)
PROT UR QL STRIP: ABNORMAL
SP GR UR STRIP: >1.03 (ref 1–1.03)
URN SPEC COLLECT METH UR: ABNORMAL
UROBILINOGEN UR STRIP-ACNC: ABNORMAL EU/DL

## 2024-12-31 PROCEDURE — 96416 CHEMO PROLONG INFUSE W/PUMP: CPT

## 2024-12-31 PROCEDURE — 96367 TX/PROPH/DG ADDL SEQ IV INF: CPT

## 2024-12-31 PROCEDURE — 25000003 PHARM REV CODE 250: Performed by: INTERNAL MEDICINE

## 2024-12-31 PROCEDURE — 63600175 PHARM REV CODE 636 W HCPCS: Performed by: INTERNAL MEDICINE

## 2024-12-31 PROCEDURE — 81003 URINALYSIS AUTO W/O SCOPE: CPT | Performed by: NURSE PRACTITIONER

## 2024-12-31 PROCEDURE — 96413 CHEMO IV INFUSION 1 HR: CPT

## 2024-12-31 PROCEDURE — 96366 THER/PROPH/DIAG IV INF ADDON: CPT

## 2024-12-31 RX ORDER — SODIUM CHLORIDE 0.9 % (FLUSH) 0.9 %
10 SYRINGE (ML) INJECTION
Status: DISCONTINUED | OUTPATIENT
Start: 2024-12-31 | End: 2024-12-31 | Stop reason: HOSPADM

## 2024-12-31 RX ORDER — PROCHLORPERAZINE EDISYLATE 5 MG/ML
5 INJECTION INTRAMUSCULAR; INTRAVENOUS ONCE AS NEEDED
Status: DISCONTINUED | OUTPATIENT
Start: 2024-12-31 | End: 2024-12-31 | Stop reason: HOSPADM

## 2024-12-31 RX ORDER — DIPHENHYDRAMINE HYDROCHLORIDE 50 MG/ML
50 INJECTION INTRAMUSCULAR; INTRAVENOUS ONCE AS NEEDED
Status: DISCONTINUED | OUTPATIENT
Start: 2024-12-31 | End: 2024-12-31 | Stop reason: HOSPADM

## 2024-12-31 RX ORDER — ONDANSETRON HCL IN 0.9 % NACL 8 MG/50 ML
8 INTRAVENOUS SOLUTION, PIGGYBACK (ML) INTRAVENOUS
Status: COMPLETED | OUTPATIENT
Start: 2024-12-31 | End: 2024-12-31

## 2024-12-31 RX ORDER — EPINEPHRINE 0.3 MG/.3ML
0.3 INJECTION SUBCUTANEOUS ONCE AS NEEDED
Status: DISCONTINUED | OUTPATIENT
Start: 2024-12-31 | End: 2024-12-31 | Stop reason: HOSPADM

## 2024-12-31 RX ADMIN — SODIUM CHLORIDE 8 MG: 9 INJECTION, SOLUTION INTRAVENOUS at 09:12

## 2024-12-31 RX ADMIN — LEUCOVORIN CALCIUM 790 MG: 350 INJECTION, POWDER, LYOPHILIZED, FOR SOLUTION INTRAMUSCULAR; INTRAVENOUS at 09:12

## 2024-12-31 RX ADMIN — FLUOROURACIL 2365 MG: 50 INJECTION, SOLUTION INTRAVENOUS at 11:12

## 2024-12-31 RX ADMIN — SODIUM CHLORIDE 385 MG: 9 INJECTION, SOLUTION INTRAVENOUS at 08:12

## 2024-12-31 NOTE — PLAN OF CARE
Problem: Fatigue  Goal: Improved Activity Tolerance  Outcome: Progressing  Intervention: Promote Improved Energy  Flowsheets (Taken 12/31/2024 2724)  Fatigue Management: frequent rest breaks encouraged  Activity Management: Ambulated -L4  Environmental Support: rest periods encouraged

## 2024-12-31 NOTE — PROGRESS NOTES
FOLLOW UP- NUTRITION     Edil Goff, 79 y.o. male is here for chemotherapy infusion with diagnosis of colon cancer. RD met with patient and family member. Patient reports decreased appetite and will be starting on an appetite stimulant soon. No c/o N/V/C/D at this time. RD provided handout on tips for decreased appetite and encouraged patient to continue to consume small amounts of high calorie/protein foods every couple of hours. Contact information provided again.     Wt Readings from Last 5 Encounters:   12/31/24 73.7 kg (162 lb 8 oz)   12/18/24 74.8 kg (164 lb 14.5 oz)   12/11/24 78.6 kg (173 lb 3.2 oz)   12/09/24 76.8 kg (169 lb 6.4 oz)   11/27/24 75.1 kg (165 lb 8 oz)       Electronically signed by: Perri Huang MS, RDN/LDN, [facility]

## 2025-01-02 ENCOUNTER — INFUSION (OUTPATIENT)
Dept: INFUSION THERAPY | Facility: HOSPITAL | Age: 80
End: 2025-01-02
Attending: INTERNAL MEDICINE
Payer: MEDICARE

## 2025-01-02 ENCOUNTER — TELEPHONE (OUTPATIENT)
Dept: INFUSION THERAPY | Facility: HOSPITAL | Age: 80
End: 2025-01-02

## 2025-01-02 VITALS
BODY MASS INDEX: 21.83 KG/M2 | DIASTOLIC BLOOD PRESSURE: 89 MMHG | SYSTOLIC BLOOD PRESSURE: 168 MMHG | RESPIRATION RATE: 17 BRPM | HEIGHT: 72 IN | WEIGHT: 161.13 LBS | OXYGEN SATURATION: 95 % | TEMPERATURE: 97 F | HEART RATE: 61 BPM

## 2025-01-02 DIAGNOSIS — C19 COLORECTAL CANCER: Primary | ICD-10-CM

## 2025-01-02 PROCEDURE — 96523 IRRIG DRUG DELIVERY DEVICE: CPT

## 2025-01-02 PROCEDURE — 63600175 PHARM REV CODE 636 W HCPCS: Performed by: INTERNAL MEDICINE

## 2025-01-02 PROCEDURE — A4216 STERILE WATER/SALINE, 10 ML: HCPCS | Performed by: INTERNAL MEDICINE

## 2025-01-02 PROCEDURE — 25000003 PHARM REV CODE 250: Performed by: INTERNAL MEDICINE

## 2025-01-02 RX ORDER — SODIUM CHLORIDE 0.9 % (FLUSH) 0.9 %
10 SYRINGE (ML) INJECTION
Status: DISCONTINUED | OUTPATIENT
Start: 2025-01-02 | End: 2025-01-02 | Stop reason: HOSPADM

## 2025-01-02 RX ORDER — HEPARIN 100 UNIT/ML
500 SYRINGE INTRAVENOUS
Status: DISCONTINUED | OUTPATIENT
Start: 2025-01-02 | End: 2025-01-02 | Stop reason: HOSPADM

## 2025-01-02 RX ADMIN — HEPARIN 500 UNITS: 100 SYRINGE at 09:01

## 2025-01-02 RX ADMIN — SODIUM CHLORIDE, PRESERVATIVE FREE 10 ML: 5 INJECTION INTRAVENOUS at 09:01

## 2025-01-02 NOTE — TELEPHONE ENCOUNTER
Pt's wife called on call phone on 12/31/24 at 1952 stating that the pt pulled port needle out of his chest due to the tape itching. I talked her through clamping, stopping and turning the pump off. She stated she did not see a leak or spill of chemo anywhere. I talked her though clamping the safety on the needle. She stated she put the chemo bag with tubing and needle in chemo spill bag at home. She stated the pt was okay and did not need to be taken to the ER. She stated his port site was not bleeding or swollen. I told her that due to the holiday and the clinic being closed on 1/1/25 that we will have to wait until 1/2/25 for the pt to come into clinic and we will contact Dr. Khoobehi. Educated her to take pt to ER if needed over the holiday, she stated understanding. Notified MELYSSA Ring RN of telephone encounter.

## 2025-01-02 NOTE — NURSING
A new chemo spill kit was given to the wife since they used the last one to put the port needle in once it was pulled out of his chest.  There was some irritation on the chest wall where the previous tegaderm was so I will enter a note in the chart that going forward, to use the sensitive skin dressing and see if this helps pt to be more comfortable so he does not pull on the dressing again.  Pts wife and pt verbalized understanding.  I also showed them how to wear the bag as a alvarez pack so that pt does not have to remember to pick it up each time he gets up.  They both agreed that the alvarez pack may be easier and more comfortable for the next cycle.

## 2025-01-02 NOTE — PLAN OF CARE
Problem: Fatigue  Goal: Improved Activity Tolerance  Intervention: Promote Improved Energy  Flowsheets (Taken 1/2/2025 2559)  Fatigue Management: fatigue-related activity identified  Activity Management: Ambulated -L4

## 2025-01-02 NOTE — NURSING
Patient's wife stated that he accidentally removed needle ( see telephone encounter from on call nurse) and per Dr Khoobehi's instruction, port accessed, and flushed and heparin locked.  Pump not replaced and 5FU d/c for the remainder of this cycle

## 2025-01-06 ENCOUNTER — PATIENT MESSAGE (OUTPATIENT)
Dept: HEMATOLOGY/ONCOLOGY | Facility: CLINIC | Age: 80
End: 2025-01-06
Payer: MEDICARE

## 2025-01-13 ENCOUNTER — TELEPHONE (OUTPATIENT)
Dept: HEMATOLOGY/ONCOLOGY | Facility: CLINIC | Age: 80
End: 2025-01-13
Payer: MEDICARE

## 2025-01-13 NOTE — TELEPHONE ENCOUNTER
Lab orders faxed.    Spoke with Jennifer at lab to notify they were faxed.     ----- Message from Radha sent at 1/13/2025  8:46 AM CST -----  Regarding: pt at lab now  Contact: Tracy [phone]  Type: Needs Medical Advice  Who Called:  [facility] w/ [facility]     Best Call Back Number: fax [phone] phone [phone]    Additional Information: Pt at facility now w/ used lab orders from 12/03/24. [facility] is requesting new orders be sent in asap. Thank you!

## 2025-01-14 ENCOUNTER — OFFICE VISIT (OUTPATIENT)
Dept: HEMATOLOGY/ONCOLOGY | Facility: CLINIC | Age: 80
End: 2025-01-14
Payer: MEDICARE

## 2025-01-14 ENCOUNTER — INFUSION (OUTPATIENT)
Dept: INFUSION THERAPY | Facility: HOSPITAL | Age: 80
End: 2025-01-14
Attending: INTERNAL MEDICINE
Payer: MEDICARE

## 2025-01-14 VITALS
OXYGEN SATURATION: 100 % | BODY MASS INDEX: 21.49 KG/M2 | WEIGHT: 158.69 LBS | DIASTOLIC BLOOD PRESSURE: 72 MMHG | TEMPERATURE: 97 F | HEART RATE: 69 BPM | HEIGHT: 72 IN | RESPIRATION RATE: 18 BRPM | SYSTOLIC BLOOD PRESSURE: 174 MMHG

## 2025-01-14 VITALS
HEART RATE: 68 BPM | DIASTOLIC BLOOD PRESSURE: 87 MMHG | WEIGHT: 158.5 LBS | HEIGHT: 72 IN | RESPIRATION RATE: 18 BRPM | TEMPERATURE: 97 F | BODY MASS INDEX: 21.47 KG/M2 | OXYGEN SATURATION: 100 % | SYSTOLIC BLOOD PRESSURE: 187 MMHG

## 2025-01-14 DIAGNOSIS — T45.1X5A CHEMOTHERAPY-INDUCED FATIGUE: ICD-10-CM

## 2025-01-14 DIAGNOSIS — C78.7 CANCER, METASTATIC TO LIVER: Primary | ICD-10-CM

## 2025-01-14 DIAGNOSIS — C19 COLORECTAL CANCER: ICD-10-CM

## 2025-01-14 DIAGNOSIS — F41.8 OTHER SPECIFIED ANXIETY DISORDERS: ICD-10-CM

## 2025-01-14 DIAGNOSIS — C19 COLORECTAL CANCER: Primary | ICD-10-CM

## 2025-01-14 DIAGNOSIS — M89.9 BONE LESION: ICD-10-CM

## 2025-01-14 DIAGNOSIS — R53.83 CHEMOTHERAPY-INDUCED FATIGUE: ICD-10-CM

## 2025-01-14 PROCEDURE — 3288F FALL RISK ASSESSMENT DOCD: CPT | Mod: CPTII,S$GLB,, | Performed by: INTERNAL MEDICINE

## 2025-01-14 PROCEDURE — 96416 CHEMO PROLONG INFUSE W/PUMP: CPT

## 2025-01-14 PROCEDURE — 1101F PT FALLS ASSESS-DOCD LE1/YR: CPT | Mod: CPTII,S$GLB,, | Performed by: INTERNAL MEDICINE

## 2025-01-14 PROCEDURE — 3077F SYST BP >= 140 MM HG: CPT | Mod: CPTII,S$GLB,, | Performed by: INTERNAL MEDICINE

## 2025-01-14 PROCEDURE — 25000003 PHARM REV CODE 250: Performed by: INTERNAL MEDICINE

## 2025-01-14 PROCEDURE — 1159F MED LIST DOCD IN RCRD: CPT | Mod: CPTII,S$GLB,, | Performed by: INTERNAL MEDICINE

## 2025-01-14 PROCEDURE — 99215 OFFICE O/P EST HI 40 MIN: CPT | Mod: S$GLB,,, | Performed by: INTERNAL MEDICINE

## 2025-01-14 PROCEDURE — 3079F DIAST BP 80-89 MM HG: CPT | Mod: CPTII,S$GLB,, | Performed by: INTERNAL MEDICINE

## 2025-01-14 PROCEDURE — 96367 TX/PROPH/DG ADDL SEQ IV INF: CPT

## 2025-01-14 PROCEDURE — G2211 COMPLEX E/M VISIT ADD ON: HCPCS | Mod: S$GLB,,, | Performed by: INTERNAL MEDICINE

## 2025-01-14 PROCEDURE — 1126F AMNT PAIN NOTED NONE PRSNT: CPT | Mod: CPTII,S$GLB,, | Performed by: INTERNAL MEDICINE

## 2025-01-14 PROCEDURE — 63600175 PHARM REV CODE 636 W HCPCS: Performed by: INTERNAL MEDICINE

## 2025-01-14 PROCEDURE — 96413 CHEMO IV INFUSION 1 HR: CPT

## 2025-01-14 PROCEDURE — 96368 THER/DIAG CONCURRENT INF: CPT

## 2025-01-14 PROCEDURE — 99999 PR PBB SHADOW E&M-EST. PATIENT-LVL IV: CPT | Mod: PBBFAC,,, | Performed by: INTERNAL MEDICINE

## 2025-01-14 RX ORDER — ONDANSETRON HYDROCHLORIDE 2 MG/ML
8 INJECTION, SOLUTION INTRAVENOUS
Status: CANCELLED | OUTPATIENT
Start: 2025-01-14

## 2025-01-14 RX ORDER — HEPARIN 100 UNIT/ML
500 SYRINGE INTRAVENOUS
Status: CANCELLED | OUTPATIENT
Start: 2025-01-16

## 2025-01-14 RX ORDER — EPINEPHRINE 0.3 MG/.3ML
0.3 INJECTION SUBCUTANEOUS ONCE AS NEEDED
Status: DISCONTINUED | OUTPATIENT
Start: 2025-01-14 | End: 2025-01-14 | Stop reason: HOSPADM

## 2025-01-14 RX ORDER — SODIUM CHLORIDE 0.9 % (FLUSH) 0.9 %
10 SYRINGE (ML) INJECTION
Status: DISCONTINUED | OUTPATIENT
Start: 2025-01-14 | End: 2025-01-14 | Stop reason: HOSPADM

## 2025-01-14 RX ORDER — HEPARIN 100 UNIT/ML
500 SYRINGE INTRAVENOUS
Status: CANCELLED | OUTPATIENT
Start: 2025-01-14

## 2025-01-14 RX ORDER — PROCHLORPERAZINE EDISYLATE 5 MG/ML
5 INJECTION INTRAMUSCULAR; INTRAVENOUS ONCE AS NEEDED
Status: DISCONTINUED | OUTPATIENT
Start: 2025-01-14 | End: 2025-01-14 | Stop reason: HOSPADM

## 2025-01-14 RX ORDER — ONDANSETRON HCL IN 0.9 % NACL 8 MG/50 ML
8 INTRAVENOUS SOLUTION, PIGGYBACK (ML) INTRAVENOUS
Status: COMPLETED | OUTPATIENT
Start: 2025-01-14 | End: 2025-01-14

## 2025-01-14 RX ORDER — CLONAZEPAM 1 MG/1
1 TABLET ORAL NIGHTLY PRN
Qty: 30 TABLET | Refills: 0 | Status: SHIPPED | OUTPATIENT
Start: 2025-01-14 | End: 2025-01-30

## 2025-01-14 RX ORDER — DIPHENHYDRAMINE HYDROCHLORIDE 50 MG/ML
50 INJECTION INTRAMUSCULAR; INTRAVENOUS ONCE AS NEEDED
Status: CANCELLED | OUTPATIENT
Start: 2025-01-14

## 2025-01-14 RX ORDER — EPINEPHRINE 0.3 MG/.3ML
0.3 INJECTION SUBCUTANEOUS ONCE AS NEEDED
Status: CANCELLED | OUTPATIENT
Start: 2025-01-14

## 2025-01-14 RX ORDER — DIPHENHYDRAMINE HYDROCHLORIDE 50 MG/ML
50 INJECTION INTRAMUSCULAR; INTRAVENOUS ONCE AS NEEDED
Status: DISCONTINUED | OUTPATIENT
Start: 2025-01-14 | End: 2025-01-14 | Stop reason: HOSPADM

## 2025-01-14 RX ORDER — SODIUM CHLORIDE 0.9 % (FLUSH) 0.9 %
10 SYRINGE (ML) INJECTION
Status: CANCELLED | OUTPATIENT
Start: 2025-01-14

## 2025-01-14 RX ORDER — PROCHLORPERAZINE EDISYLATE 5 MG/ML
5 INJECTION INTRAMUSCULAR; INTRAVENOUS ONCE AS NEEDED
Status: CANCELLED | OUTPATIENT
Start: 2025-01-14

## 2025-01-14 RX ORDER — SODIUM CHLORIDE 0.9 % (FLUSH) 0.9 %
10 SYRINGE (ML) INJECTION
Status: CANCELLED | OUTPATIENT
Start: 2025-01-16

## 2025-01-14 RX ORDER — PROCHLORPERAZINE EDISYLATE 5 MG/ML
5 INJECTION INTRAMUSCULAR; INTRAVENOUS ONCE AS NEEDED
Status: CANCELLED | OUTPATIENT
Start: 2025-01-16

## 2025-01-14 RX ADMIN — FLUOROURACIL 2365 MG: 50 INJECTION, SOLUTION INTRAVENOUS at 03:01

## 2025-01-14 RX ADMIN — SODIUM CHLORIDE 385 MG: 9 INJECTION, SOLUTION INTRAVENOUS at 11:01

## 2025-01-14 RX ADMIN — DEXTROSE MONOHYDRATE 790 MG: 50 INJECTION, SOLUTION INTRAVENOUS at 12:01

## 2025-01-14 RX ADMIN — SODIUM CHLORIDE 8 MG: 9 INJECTION, SOLUTION INTRAVENOUS at 12:01

## 2025-01-14 RX ADMIN — SODIUM CHLORIDE: 9 INJECTION, SOLUTION INTRAVENOUS at 11:01

## 2025-01-14 NOTE — PROGRESS NOTES
Service Date:  1/14/25    Chief Complaint: colon cancer    Edil Goff is a 79 y.o. male here with metastatic colorectal cancer.  Patient has a history of right hemicolectomy with pathology showing a [id number] lesion.  Patient was then started on adjuvant FOLFOX, but had poor tolerance after 1 cycle.  The 5 FU bolus was dropped and oxaliplatin dose was lowered, but patient still continued to have poor tolerance had severe protein calorie malnutrition following chemotherapy.  Therefore it was decided to be held.  Patient then had 2 liver lesions biopsied positive to be metastatic colon cancer.  He was then started on Vectibix with no improvement in the lesions, which were ultimately treated with SIRT.  He has no longer been on Vectibix for a few months.    Here prior to cycle 4.  Doing okay.  Still having issues with appetite.  Otherwise tolerating treatment.  Has on and off issues confusion but this is better than it was prior.      Review of Systems   Constitutional: Negative.  Negative for appetite change and unexpected weight change.   HENT: Negative.  Negative for mouth sores.    Eyes: Negative.  Negative for visual disturbance.   Respiratory: Negative.  Negative for cough and shortness of breath.    Cardiovascular: Negative.  Negative for chest pain.   Gastrointestinal: Negative.  Negative for abdominal pain and diarrhea.   Endocrine: Negative.    Genitourinary:  Positive for frequency.   Musculoskeletal: Negative.  Negative for back pain.   Integumentary:  Negative for rash. Negative.   Neurological: Negative.  Negative for headaches.   Hematological: Negative.  Negative for adenopathy.   Psychiatric/Behavioral:  The patient is nervous/anxious.         Current Outpatient Medications   Medication Instructions    amLODIPine (NORVASC) 5 mg, Every morning    amoxicillin-clavulanate 875-125mg (AUGMENTIN) 875-125 mg per tablet 1 tablet, Oral, 2 times daily    ascorbic acid (VITAMIN C ORAL) Every morning    aspirin  325 mg, Daily    buPROPion (WELLBUTRIN XL) 300 mg, Nightly    clonazePAM (KLONOPIN) 1 mg, Oral, Nightly PRN    droNABinol (MARINOL) 2.5 mg, Oral, 2 times daily before meals, Take 1 hour before lunch and dinner    ergocalciferol, vitamin D2, (VITAMIN D2 ORAL) Daily    EScitalopram oxalate (LEXAPRO) 10 mg, Every morning    ferrous sulfate (FEOSOL) 325 mg, Oral, Daily    LIDOcaine-prilocaine (EMLA) cream Topical (Top), As needed (PRN)    losartan (COZAAR) 50 mg, Every morning    ondansetron (ZOFRAN) 8 mg, Oral, Every 8 hours PRN    prochlorperazine (COMPAZINE) 10 mg, Oral, Every 6 hours PRN    rivastigmine tartrate (EXELON) 1.5 mg, 2 times daily    traZODone (DESYREL) 50 mg, Nightly        Past Medical History:   Diagnosis Date    Cancer     colon    Hyperlipidemia     Hypertension     TIA (transient ischemic attack)         Past Surgical History:   Procedure Laterality Date    COLON SURGERY  2023    resection    TONSILLECTOMY          No family history on file.    Social History     Tobacco Use    Smoking status: Never    Smokeless tobacco: Never   Substance Use Topics    Alcohol use: Never    Drug use: Never         Vitals:    01/14/25 1003   BP: (!) 187/87   Pulse: 68   Resp: 18   Temp: 97.2 °F (36.2 °C)            Physical Exam:  BP (!) 187/87 (BP Location: Right arm, Patient Position: Sitting)   Pulse 68   Temp 97.2 °F (36.2 °C) (Temporal)   Resp 18   Ht 6' (1.829 m)   Wt 71.9 kg (158 lb 8.2 oz)   SpO2 100%   BMI 21.50 kg/m²     Physical Exam  Vitals and nursing note reviewed.   Constitutional:       Appearance: Normal appearance.   HENT:      Head: Normocephalic and atraumatic.      Nose: Nose normal.      Mouth/Throat:      Mouth: Mucous membranes are moist.      Pharynx: Oropharynx is clear.   Eyes:      Extraocular Movements: Extraocular movements intact.      Conjunctiva/sclera: Conjunctivae normal.   Cardiovascular:      Rate and Rhythm: Normal rate and regular rhythm.      Heart sounds: Normal heart  sounds.   Pulmonary:      Effort: Pulmonary effort is normal.      Breath sounds: Normal breath sounds.   Abdominal:      General: Abdomen is flat. Bowel sounds are normal.      Palpations: Abdomen is soft.   Musculoskeletal:         General: Normal range of motion.      Cervical back: Normal range of motion and neck supple.   Skin:     General: Skin is warm and dry.   Neurological:      General: No focal deficit present.      Mental Status: He is alert and oriented to person, place, and time. Mental status is at baseline.   Psychiatric:         Mood and Affect: Mood normal.          Labs:  Lab Results   Component Value Date    WBC 5.80 08/14/2024    RBC 4.00 (L) 08/14/2024    HGB 11.2 (L) 08/14/2024    HCT 36.1 (L) 08/14/2024    MCV 90 08/14/2024    MCH 28.0 08/14/2024    MCHC 31.0 (L) 08/14/2024    RDW 13.2 08/14/2024     08/14/2024    MPV 9.9 08/14/2024    GRAN 3.8 08/14/2024    GRAN 65.1 08/14/2024    LYMPH 1.2 08/14/2024    LYMPH 19.8 08/14/2024    MONO 0.7 08/14/2024    MONO 12.1 08/14/2024    EOS 0.2 08/14/2024    BASO 0.01 08/14/2024    EOSINOPHIL 2.6 08/14/2024    BASOPHIL 0.2 08/14/2024     Sodium   Date Value Ref Range Status   08/14/2024 139 136 - 145 mmol/L Final     Potassium   Date Value Ref Range Status   08/14/2024 4.3 3.5 - 5.1 mmol/L Final     Chloride   Date Value Ref Range Status   08/14/2024 106 95 - 110 mmol/L Final     CO2   Date Value Ref Range Status   08/14/2024 25 23 - 29 mmol/L Final     Glucose   Date Value Ref Range Status   08/14/2024 96 70 - 110 mg/dL Final     BUN   Date Value Ref Range Status   08/14/2024 19 8 - 23 mg/dL Final     Creatinine   Date Value Ref Range Status   08/14/2024 1.1 0.5 - 1.4 mg/dL Final     Calcium   Date Value Ref Range Status   08/14/2024 9.7 8.7 - 10.5 mg/dL Final     Total Protein   Date Value Ref Range Status   08/14/2024 6.4 6.0 - 8.4 g/dL Final     Albumin   Date Value Ref Range Status   08/14/2024 3.9 3.5 - 5.2 g/dL Final     Total Bilirubin    Date Value Ref Range Status   08/14/2024 0.8 0.1 - 1.0 mg/dL Final     Comment:     For infants and newborns, interpretation of results should be based  on gestational age, weight and in agreement with clinical  observations.    Premature Infant recommended reference ranges:  Up to 24 hours.............<8.0 mg/dL  Up to 48 hours............<12.0 mg/dL  3-5 days..................<15.0 mg/dL  6-29 days.................<15.0 mg/dL       Alkaline Phosphatase   Date Value Ref Range Status   08/14/2024 55 55 - 135 U/L Final     AST   Date Value Ref Range Status   08/14/2024 19 10 - 40 U/L Final     ALT   Date Value Ref Range Status   08/14/2024 13 10 - 44 U/L Final     Anion Gap   Date Value Ref Range Status   08/14/2024 8 8 - 16 mmol/L Final       A/P:    Loss of appetite   -has been eating smaller meals.  Also taking dronabinol.  Not really helping.    Metastatic colon adenocarcinoma   -oligometastatic with 2 lesions in the liver treated with SIRT  -after initial visit with me, CT chest was done which shows numerous lung nodules suspicious for metastasis, not previously present  -pharmacogenomic shows that patient has a poor metabolizer of 5 FU, but after discussing with pharmacy, it can be given at 50% dosage  -to clinic for cycle 5 of treatment    Iron deficiency anemia   -improved with IV iron  -continue to monitor     Bone lesion  -seen on CT abdomen  -lesion on the L2 lumbar spine   -plan to repeat another scan in the future, with MRI.  We will need sedation prior.      Aurash Khoobehi, MD  Hematology and Oncology    Visit today included increased complexity associated with the care of the episodic problem colon cancer addressed and managing the longitudinal care of the patient due to the serious and/or complex managed problem(s).

## 2025-01-14 NOTE — PLAN OF CARE
Problem: Fatigue  Goal: Improved Activity Tolerance  Intervention: Promote Improved Energy  Flowsheets (Taken 1/14/2025 1003)  Fatigue Management: fatigue-related activity identified  Activity Management: Ambulated -L4

## 2025-01-15 ENCOUNTER — PATIENT MESSAGE (OUTPATIENT)
Dept: HEMATOLOGY/ONCOLOGY | Facility: CLINIC | Age: 80
End: 2025-01-15
Payer: MEDICARE

## 2025-01-16 ENCOUNTER — INFUSION (OUTPATIENT)
Dept: INFUSION THERAPY | Facility: HOSPITAL | Age: 80
End: 2025-01-16
Attending: INTERNAL MEDICINE
Payer: MEDICARE

## 2025-01-16 VITALS
RESPIRATION RATE: 16 BRPM | DIASTOLIC BLOOD PRESSURE: 80 MMHG | OXYGEN SATURATION: 99 % | TEMPERATURE: 97 F | HEART RATE: 75 BPM | HEIGHT: 72 IN | SYSTOLIC BLOOD PRESSURE: 140 MMHG | WEIGHT: 159 LBS | BODY MASS INDEX: 21.54 KG/M2

## 2025-01-16 DIAGNOSIS — C19 COLORECTAL CANCER: Primary | ICD-10-CM

## 2025-01-16 PROCEDURE — A4216 STERILE WATER/SALINE, 10 ML: HCPCS | Performed by: INTERNAL MEDICINE

## 2025-01-16 PROCEDURE — 25000003 PHARM REV CODE 250: Performed by: INTERNAL MEDICINE

## 2025-01-16 PROCEDURE — 96523 IRRIG DRUG DELIVERY DEVICE: CPT

## 2025-01-16 PROCEDURE — 63600175 PHARM REV CODE 636 W HCPCS: Performed by: INTERNAL MEDICINE

## 2025-01-16 RX ORDER — SODIUM CHLORIDE 0.9 % (FLUSH) 0.9 %
10 SYRINGE (ML) INJECTION
Status: DISCONTINUED | OUTPATIENT
Start: 2025-01-16 | End: 2025-01-16 | Stop reason: HOSPADM

## 2025-01-16 RX ORDER — HEPARIN 100 UNIT/ML
500 SYRINGE INTRAVENOUS
Status: DISCONTINUED | OUTPATIENT
Start: 2025-01-16 | End: 2025-01-16 | Stop reason: HOSPADM

## 2025-01-16 RX ADMIN — HEPARIN 500 UNITS: 100 SYRINGE at 01:01

## 2025-01-16 RX ADMIN — SODIUM CHLORIDE, PRESERVATIVE FREE 10 ML: 5 INJECTION INTRAVENOUS at 01:01

## 2025-01-16 NOTE — PLAN OF CARE
Problem: Fall Injury Risk  Goal: Absence of Fall and Fall-Related Injury  Outcome: Progressing  Intervention: Identify and Manage Contributors  Flowsheets (Taken 1/16/2025 1318)  Medication Review/Management: medications reviewed  Intervention: Promote Injury-Free Environment  Flowsheets (Taken 1/16/2025 1318)  Safety Promotion/Fall Prevention: assistive device/personal item within reach

## 2025-01-20 ENCOUNTER — TELEPHONE (OUTPATIENT)
Dept: INTERVENTIONAL RADIOLOGY/VASCULAR | Facility: HOSPITAL | Age: 80
End: 2025-01-20
Payer: MEDICARE

## 2025-01-20 NOTE — NURSING
Called patient to reschedule clinic consult on 1/22/25 d/t weather.  Spoke to patients daughter.  Patient rescheduled to 2/12/25 @ 1pm.

## 2025-01-20 NOTE — NURSING
Called patient to reschedule clinic visit tomorrow d/t weather.  Left message with call back # on oth #'s listed in chart.

## 2025-01-27 ENCOUNTER — PATIENT MESSAGE (OUTPATIENT)
Dept: HEMATOLOGY/ONCOLOGY | Facility: CLINIC | Age: 80
End: 2025-01-27

## 2025-01-27 ENCOUNTER — TELEPHONE (OUTPATIENT)
Dept: INFUSION THERAPY | Facility: HOSPITAL | Age: 80
End: 2025-01-27

## 2025-01-27 ENCOUNTER — OFFICE VISIT (OUTPATIENT)
Dept: HEMATOLOGY/ONCOLOGY | Facility: CLINIC | Age: 80
End: 2025-01-27
Payer: MEDICARE

## 2025-01-27 DIAGNOSIS — C19 COLORECTAL CANCER: ICD-10-CM

## 2025-01-27 DIAGNOSIS — T45.1X5A CHEMOTHERAPY-INDUCED FATIGUE: ICD-10-CM

## 2025-01-27 DIAGNOSIS — C78.01 MALIGNANT NEOPLASM METASTATIC TO BOTH LUNGS: ICD-10-CM

## 2025-01-27 DIAGNOSIS — C78.02 MALIGNANT NEOPLASM METASTATIC TO BOTH LUNGS: ICD-10-CM

## 2025-01-27 DIAGNOSIS — C78.7 CANCER, METASTATIC TO LIVER: Primary | ICD-10-CM

## 2025-01-27 DIAGNOSIS — R53.83 CHEMOTHERAPY-INDUCED FATIGUE: ICD-10-CM

## 2025-01-27 RX ORDER — SODIUM CHLORIDE 0.9 % (FLUSH) 0.9 %
10 SYRINGE (ML) INJECTION
Status: CANCELLED | OUTPATIENT
Start: 2025-01-30

## 2025-01-27 RX ORDER — HEPARIN 100 UNIT/ML
500 SYRINGE INTRAVENOUS
Status: CANCELLED | OUTPATIENT
Start: 2025-01-30

## 2025-01-27 RX ORDER — SODIUM CHLORIDE 0.9 % (FLUSH) 0.9 %
10 SYRINGE (ML) INJECTION
Status: CANCELLED | OUTPATIENT
Start: 2025-01-28

## 2025-01-27 RX ORDER — EPINEPHRINE 0.3 MG/.3ML
0.3 INJECTION SUBCUTANEOUS ONCE AS NEEDED
Status: CANCELLED | OUTPATIENT
Start: 2025-01-28

## 2025-01-27 RX ORDER — DIPHENHYDRAMINE HYDROCHLORIDE 50 MG/ML
50 INJECTION INTRAMUSCULAR; INTRAVENOUS ONCE AS NEEDED
Status: CANCELLED | OUTPATIENT
Start: 2025-01-28

## 2025-01-27 RX ORDER — PROCHLORPERAZINE EDISYLATE 5 MG/ML
5 INJECTION INTRAMUSCULAR; INTRAVENOUS ONCE AS NEEDED
Status: CANCELLED | OUTPATIENT
Start: 2025-01-28

## 2025-01-27 RX ORDER — PROCHLORPERAZINE EDISYLATE 5 MG/ML
5 INJECTION INTRAMUSCULAR; INTRAVENOUS ONCE AS NEEDED
Status: CANCELLED | OUTPATIENT
Start: 2025-01-30

## 2025-01-27 RX ORDER — HEPARIN 100 UNIT/ML
500 SYRINGE INTRAVENOUS
Status: CANCELLED | OUTPATIENT
Start: 2025-01-28

## 2025-01-27 RX ORDER — ONDANSETRON HYDROCHLORIDE 2 MG/ML
8 INJECTION, SOLUTION INTRAVENOUS
Status: CANCELLED | OUTPATIENT
Start: 2025-01-28

## 2025-01-27 NOTE — PROGRESS NOTES
Service Date:  1/27/25    Chief Complaint: colon cancer    Edil Goff is a 79 y.o. male here with metastatic colorectal cancer.  Patient has a history of right hemicolectomy with pathology showing a [id number] lesion.  Patient was then started on adjuvant FOLFOX, but had poor tolerance after 1 cycle.  The 5 FU bolus was dropped and oxaliplatin dose was lowered, but patient still continued to have poor tolerance had severe protein calorie malnutrition following chemotherapy.  Therefore it was decided to be held.  Patient then had 2 liver lesions biopsied positive to be metastatic colon cancer.  He was then started on Vectibix with no improvement in the lesions, which were ultimately treated with SIRT.  He has no longer been on Vectibix for a few months.    Here prior to cycle 5.  Doing okay.  Still having issues with appetite.  Otherwise tolerating treatment.  Having some constipation.  Takes Colace with no relief.  Gets bloating from this.      Review of Systems   Constitutional: Negative.  Negative for appetite change and unexpected weight change.   HENT: Negative.  Negative for mouth sores.    Eyes: Negative.  Negative for visual disturbance.   Respiratory: Negative.  Negative for cough and shortness of breath.    Cardiovascular: Negative.  Negative for chest pain.   Gastrointestinal:  Positive for constipation. Negative for abdominal pain and diarrhea.   Endocrine: Negative.    Genitourinary:  Positive for frequency.   Musculoskeletal: Negative.  Negative for back pain.   Integumentary:  Negative for rash. Negative.   Neurological: Negative.  Negative for headaches.   Hematological: Negative.  Negative for adenopathy.   Psychiatric/Behavioral:  The patient is nervous/anxious.         Current Outpatient Medications   Medication Instructions    amLODIPine (NORVASC) 5 mg, Every morning    amoxicillin-clavulanate 875-125mg (AUGMENTIN) 875-125 mg per tablet 1 tablet, Oral, 2 times daily    ascorbic acid (VITAMIN C  ORAL) Every morning    aspirin 325 mg, Daily    buPROPion (WELLBUTRIN XL) 300 mg, Nightly    clonazePAM (KLONOPIN) 1 mg, Oral, Nightly PRN    droNABinol (MARINOL) 2.5 mg, Oral, 2 times daily before meals, Take 1 hour before lunch and dinner    ergocalciferol, vitamin D2, (VITAMIN D2 ORAL) Daily    EScitalopram oxalate (LEXAPRO) 10 mg, Every morning    ferrous sulfate (FEOSOL) 325 mg, Oral, Daily    LIDOcaine-prilocaine (EMLA) cream Topical (Top), As needed (PRN)    losartan (COZAAR) 50 mg, Every morning    ondansetron (ZOFRAN) 8 mg, Oral, Every 8 hours PRN    prochlorperazine (COMPAZINE) 10 mg, Oral, Every 6 hours PRN    rivastigmine tartrate (EXELON) 1.5 mg, 2 times daily    traZODone (DESYREL) 50 mg, Nightly        Past Medical History:   Diagnosis Date    Cancer     colon    Hyperlipidemia     Hypertension     TIA (transient ischemic attack)         Past Surgical History:   Procedure Laterality Date    COLON SURGERY  2023    resection    TONSILLECTOMY          No family history on file.    Social History     Tobacco Use    Smoking status: Never    Smokeless tobacco: Never   Substance Use Topics    Alcohol use: Never    Drug use: Never         There were no vitals filed for this visit.           Physical Exam:  There were no vitals taken for this visit.    Physical Exam  Vitals and nursing note reviewed.   Constitutional:       Appearance: Normal appearance.   HENT:      Head: Normocephalic and atraumatic.      Nose: Nose normal.      Mouth/Throat:      Mouth: Mucous membranes are moist.      Pharynx: Oropharynx is clear.   Eyes:      Extraocular Movements: Extraocular movements intact.      Conjunctiva/sclera: Conjunctivae normal.   Cardiovascular:      Rate and Rhythm: Normal rate and regular rhythm.      Heart sounds: Normal heart sounds.   Pulmonary:      Effort: Pulmonary effort is normal.      Breath sounds: Normal breath sounds.   Abdominal:      General: Abdomen is flat. Bowel sounds are normal.       Palpations: Abdomen is soft.   Musculoskeletal:         General: Normal range of motion.      Cervical back: Normal range of motion and neck supple.   Skin:     General: Skin is warm and dry.   Neurological:      General: No focal deficit present.      Mental Status: He is alert and oriented to person, place, and time. Mental status is at baseline.   Psychiatric:         Mood and Affect: Mood normal.          Labs:  Lab Results   Component Value Date    WBC 5.80 08/14/2024    RBC 4.00 (L) 08/14/2024    HGB 11.2 (L) 08/14/2024    HCT 36.1 (L) 08/14/2024    MCV 90 08/14/2024    MCH 28.0 08/14/2024    MCHC 31.0 (L) 08/14/2024    RDW 13.2 08/14/2024     08/14/2024    MPV 9.9 08/14/2024    GRAN 3.8 08/14/2024    GRAN 65.1 08/14/2024    LYMPH 1.2 08/14/2024    LYMPH 19.8 08/14/2024    MONO 0.7 08/14/2024    MONO 12.1 08/14/2024    EOS 0.2 08/14/2024    BASO 0.01 08/14/2024    EOSINOPHIL 2.6 08/14/2024    BASOPHIL 0.2 08/14/2024     Sodium   Date Value Ref Range Status   08/14/2024 139 136 - 145 mmol/L Final     Potassium   Date Value Ref Range Status   08/14/2024 4.3 3.5 - 5.1 mmol/L Final     Chloride   Date Value Ref Range Status   08/14/2024 106 95 - 110 mmol/L Final     CO2   Date Value Ref Range Status   08/14/2024 25 23 - 29 mmol/L Final     Glucose   Date Value Ref Range Status   08/14/2024 96 70 - 110 mg/dL Final     BUN   Date Value Ref Range Status   08/14/2024 19 8 - 23 mg/dL Final     Creatinine   Date Value Ref Range Status   08/14/2024 1.1 0.5 - 1.4 mg/dL Final     Calcium   Date Value Ref Range Status   08/14/2024 9.7 8.7 - 10.5 mg/dL Final     Total Protein   Date Value Ref Range Status   08/14/2024 6.4 6.0 - 8.4 g/dL Final     Albumin   Date Value Ref Range Status   08/14/2024 3.9 3.5 - 5.2 g/dL Final     Total Bilirubin   Date Value Ref Range Status   08/14/2024 0.8 0.1 - 1.0 mg/dL Final     Comment:     For infants and newborns, interpretation of results should be based  on gestational age,  weight and in agreement with clinical  observations.    Premature Infant recommended reference ranges:  Up to 24 hours.............<8.0 mg/dL  Up to 48 hours............<12.0 mg/dL  3-5 days..................<15.0 mg/dL  6-29 days.................<15.0 mg/dL       Alkaline Phosphatase   Date Value Ref Range Status   08/14/2024 55 55 - 135 U/L Final     AST   Date Value Ref Range Status   08/14/2024 19 10 - 40 U/L Final     ALT   Date Value Ref Range Status   08/14/2024 13 10 - 44 U/L Final     Anion Gap   Date Value Ref Range Status   08/14/2024 8 8 - 16 mmol/L Final       A/P:    Loss of appetite   -has been eating smaller meals.  Also taking dronabinol.  Not really helping.    Metastatic colon adenocarcinoma   -oligometastatic with 2 lesions in the liver treated with SIRT  -after initial visit with me, CT chest was done which shows numerous lung nodules suspicious for metastasis, not previously present  -pharmacogenomic shows that patient has a poor metabolizer of 5 FU, but after discussing with pharmacy, it can be given at 50% dosage  -okay for cycle 5 if labs OK.  Return to clinic in 2 weeks for cycle 6.  We will get scans after    Iron deficiency anemia   -improved with IV iron  -continue to monitor     Bone lesion  -seen on CT abdomen  -lesion on the L2 lumbar spine   -plan to repeat another scan in the future, with MRI.  We will need sedation prior.    Constipation  -already on Colace. Told to add Miralax.      Aurash Khoobehi, MD  Hematology and Oncology    Visit today included increased complexity associated with the care of the episodic problem colon cancer addressed and managing the longitudinal care of the patient due to the serious and/or complex managed problem(s).

## 2025-01-27 NOTE — Clinical Note
Only had cbc today. Needs CMP and UA tomorrow morning to be clear for chemo. RTC in 2 weeks with more labs.

## 2025-01-27 NOTE — TELEPHONE ENCOUNTER
Spoke with patient's daughter regarding labs prior to treatment tomorrow. Informed patient will be going to [facility] today to have them done. I voiced understanding and note added to chart

## 2025-01-28 ENCOUNTER — PATIENT MESSAGE (OUTPATIENT)
Dept: HEMATOLOGY/ONCOLOGY | Facility: CLINIC | Age: 80
End: 2025-01-28
Payer: MEDICARE

## 2025-01-28 ENCOUNTER — INFUSION (OUTPATIENT)
Dept: INFUSION THERAPY | Facility: HOSPITAL | Age: 80
End: 2025-01-28
Attending: INTERNAL MEDICINE
Payer: MEDICARE

## 2025-01-28 ENCOUNTER — HOSPITAL ENCOUNTER (OUTPATIENT)
Dept: RADIOLOGY | Facility: HOSPITAL | Age: 80
Discharge: HOME | End: 2025-01-28
Attending: NURSE PRACTITIONER
Payer: MEDICARE

## 2025-01-28 ENCOUNTER — TELEPHONE (OUTPATIENT)
Dept: HEMATOLOGY/ONCOLOGY | Facility: CLINIC | Age: 80
End: 2025-01-28
Payer: MEDICARE

## 2025-01-28 VITALS
BODY MASS INDEX: 21.52 KG/M2 | HEART RATE: 67 BPM | DIASTOLIC BLOOD PRESSURE: 79 MMHG | RESPIRATION RATE: 17 BRPM | OXYGEN SATURATION: 99 % | SYSTOLIC BLOOD PRESSURE: 133 MMHG | TEMPERATURE: 97 F | HEIGHT: 72 IN | WEIGHT: 158.88 LBS

## 2025-01-28 DIAGNOSIS — K59.00 CONSTIPATION, UNSPECIFIED CONSTIPATION TYPE: Primary | ICD-10-CM

## 2025-01-28 DIAGNOSIS — K59.00 CONSTIPATION, UNSPECIFIED CONSTIPATION TYPE: ICD-10-CM

## 2025-01-28 DIAGNOSIS — C19 COLORECTAL CANCER: Primary | ICD-10-CM

## 2025-01-28 PROCEDURE — 63600175 PHARM REV CODE 636 W HCPCS: Performed by: INTERNAL MEDICINE

## 2025-01-28 PROCEDURE — 96416 CHEMO PROLONG INFUSE W/PUMP: CPT

## 2025-01-28 PROCEDURE — 96367 TX/PROPH/DG ADDL SEQ IV INF: CPT

## 2025-01-28 PROCEDURE — 74018 RADEX ABDOMEN 1 VIEW: CPT | Mod: TC

## 2025-01-28 PROCEDURE — 96413 CHEMO IV INFUSION 1 HR: CPT

## 2025-01-28 PROCEDURE — 25000003 PHARM REV CODE 250: Performed by: INTERNAL MEDICINE

## 2025-01-28 PROCEDURE — 96366 THER/PROPH/DIAG IV INF ADDON: CPT

## 2025-01-28 PROCEDURE — 74018 RADEX ABDOMEN 1 VIEW: CPT | Mod: 26,,, | Performed by: RADIOLOGY

## 2025-01-28 RX ORDER — DIPHENHYDRAMINE HYDROCHLORIDE 50 MG/ML
50 INJECTION INTRAMUSCULAR; INTRAVENOUS ONCE AS NEEDED
Status: DISCONTINUED | OUTPATIENT
Start: 2025-01-28 | End: 2025-01-28 | Stop reason: HOSPADM

## 2025-01-28 RX ORDER — EPINEPHRINE 0.3 MG/.3ML
0.3 INJECTION SUBCUTANEOUS ONCE AS NEEDED
Status: DISCONTINUED | OUTPATIENT
Start: 2025-01-28 | End: 2025-01-28 | Stop reason: HOSPADM

## 2025-01-28 RX ORDER — LACTULOSE 10 G/15ML
20 SOLUTION ORAL 3 TIMES DAILY
Qty: 300 ML | Refills: 1 | Status: SHIPPED | OUTPATIENT
Start: 2025-01-28

## 2025-01-28 RX ORDER — PROCHLORPERAZINE EDISYLATE 5 MG/ML
5 INJECTION INTRAMUSCULAR; INTRAVENOUS ONCE AS NEEDED
Status: DISCONTINUED | OUTPATIENT
Start: 2025-01-28 | End: 2025-01-28 | Stop reason: HOSPADM

## 2025-01-28 RX ORDER — ONDANSETRON HCL IN 0.9 % NACL 8 MG/50 ML
8 INTRAVENOUS SOLUTION, PIGGYBACK (ML) INTRAVENOUS
Status: COMPLETED | OUTPATIENT
Start: 2025-01-28 | End: 2025-01-28

## 2025-01-28 RX ORDER — SODIUM CHLORIDE 0.9 % (FLUSH) 0.9 %
10 SYRINGE (ML) INJECTION
Status: DISCONTINUED | OUTPATIENT
Start: 2025-01-28 | End: 2025-01-28 | Stop reason: HOSPADM

## 2025-01-28 RX ADMIN — FLUOROURACIL 2365 MG: 50 INJECTION, SOLUTION INTRAVENOUS at 01:01

## 2025-01-28 RX ADMIN — SODIUM CHLORIDE 385 MG: 9 INJECTION, SOLUTION INTRAVENOUS at 10:01

## 2025-01-28 RX ADMIN — SODIUM CHLORIDE: 900 INJECTION, SOLUTION INTRAVENOUS at 10:01

## 2025-01-28 RX ADMIN — SODIUM CHLORIDE 8 MG: 9 INJECTION, SOLUTION INTRAVENOUS at 10:01

## 2025-01-28 RX ADMIN — DEXTROSE MONOHYDRATE 790 MG: 12500 INJECTION, SOLUTION INTRAVENOUS at 11:01

## 2025-01-28 NOTE — PLAN OF CARE
Problem: Fatigue  Goal: Improved Activity Tolerance  Intervention: Promote Improved Energy  Flowsheets (Taken 1/28/2025 5308)  Fatigue Management: fatigue-related activity identified  Activity Management: Ambulated -L4

## 2025-01-28 NOTE — TELEPHONE ENCOUNTER
Spoke with pt's daughter. She states that pt attempted to do UA yesterday and put water in the urine sample cup. Pt will go to lab this morning for repeat lab. She reports that pt has not had a BM in several days (this is the 4th day). States that pt took miralax, colace, and Mg Citrate yesterday with no results. She is requesting KUB.     Spoke with pt's wife. She states that pt is not passing gas, but burping a lot. She states that he had severe abdominal cramping last night for several hrs, which they believe to be caused by his constipation. She states that after taking medications yesterday to help move bowels, pt had reflux. She will bring pt to [facility] for UA this morning.

## 2025-01-28 NOTE — TELEPHONE ENCOUNTER
Notiifed pt's daughter that KUB was ordered.     Spoke with pt's wife to notify of scheduled KUB.

## 2025-01-30 ENCOUNTER — PATIENT MESSAGE (OUTPATIENT)
Dept: HEMATOLOGY/ONCOLOGY | Facility: CLINIC | Age: 80
End: 2025-01-30
Payer: MEDICARE

## 2025-01-30 ENCOUNTER — INFUSION (OUTPATIENT)
Dept: INFUSION THERAPY | Facility: HOSPITAL | Age: 80
End: 2025-01-30
Attending: INTERNAL MEDICINE
Payer: MEDICARE

## 2025-01-30 VITALS
DIASTOLIC BLOOD PRESSURE: 75 MMHG | RESPIRATION RATE: 17 BRPM | TEMPERATURE: 97 F | HEART RATE: 64 BPM | HEIGHT: 72 IN | SYSTOLIC BLOOD PRESSURE: 122 MMHG | WEIGHT: 159.88 LBS | BODY MASS INDEX: 21.65 KG/M2 | OXYGEN SATURATION: 100 %

## 2025-01-30 DIAGNOSIS — C19 COLORECTAL CANCER: Primary | ICD-10-CM

## 2025-01-30 DIAGNOSIS — R45.89 ANXIETY ABOUT HEALTH: Primary | ICD-10-CM

## 2025-01-30 LAB — POCT GLUCOSE: 83 MG/DL (ref 70–110)

## 2025-01-30 PROCEDURE — 25000003 PHARM REV CODE 250: Performed by: NURSE PRACTITIONER

## 2025-01-30 PROCEDURE — 63600175 PHARM REV CODE 636 W HCPCS: Performed by: INTERNAL MEDICINE

## 2025-01-30 PROCEDURE — 96374 THER/PROPH/DIAG INJ IV PUSH: CPT

## 2025-01-30 PROCEDURE — 96361 HYDRATE IV INFUSION ADD-ON: CPT

## 2025-01-30 RX ORDER — SODIUM CHLORIDE 9 MG/ML
INJECTION, SOLUTION INTRAVENOUS ONCE
Status: COMPLETED | OUTPATIENT
Start: 2025-01-30 | End: 2025-01-30

## 2025-01-30 RX ORDER — HEPARIN 100 UNIT/ML
500 SYRINGE INTRAVENOUS
Status: DISCONTINUED | OUTPATIENT
Start: 2025-01-30 | End: 2025-01-30 | Stop reason: HOSPADM

## 2025-01-30 RX ORDER — LORAZEPAM 0.5 MG/1
0.5 TABLET ORAL EVERY 8 HOURS PRN
Qty: 30 TABLET | Refills: 0 | Status: SHIPPED | OUTPATIENT
Start: 2025-01-30 | End: 2026-01-30

## 2025-01-30 RX ORDER — SODIUM CHLORIDE 0.9 % (FLUSH) 0.9 %
10 SYRINGE (ML) INJECTION
Status: DISCONTINUED | OUTPATIENT
Start: 2025-01-30 | End: 2025-01-30 | Stop reason: HOSPADM

## 2025-01-30 RX ORDER — SODIUM CHLORIDE 9 MG/ML
INJECTION, SOLUTION INTRAVENOUS ONCE
Status: CANCELLED
Start: 2025-01-30 | End: 2025-01-30

## 2025-01-30 RX ORDER — LORAZEPAM 2 MG/ML
0.5 INJECTION INTRAMUSCULAR ONCE
Status: COMPLETED | OUTPATIENT
Start: 2025-01-30 | End: 2025-01-30

## 2025-01-30 RX ADMIN — SODIUM CHLORIDE: 9 INJECTION, SOLUTION INTRAVENOUS at 12:01

## 2025-01-30 RX ADMIN — LORAZEPAM 0.5 MG: 2 INJECTION INTRAMUSCULAR; INTRAVENOUS at 12:01

## 2025-01-30 RX ADMIN — HEPARIN 500 UNITS: 100 SYRINGE at 01:01

## 2025-01-30 NOTE — PLAN OF CARE
Problem: Fatigue  Goal: Improved Activity Tolerance  Intervention: Promote Improved Energy  Flowsheets (Taken 1/30/2025 1133)  Fatigue Management: frequent rest breaks encouraged  Activity Management: Ambulated -L4

## 2025-02-05 DIAGNOSIS — F41.8 OTHER SPECIFIED ANXIETY DISORDERS: Primary | ICD-10-CM

## 2025-02-05 RX ORDER — LORAZEPAM 2 MG/ML
1 CONCENTRATE ORAL EVERY 8 HOURS PRN
Qty: 30 ML | Refills: 0 | Status: SHIPPED | OUTPATIENT
Start: 2025-02-05 | End: 2025-03-07

## 2025-02-07 ENCOUNTER — TELEPHONE (OUTPATIENT)
Dept: HEMATOLOGY/ONCOLOGY | Facility: CLINIC | Age: 80
End: 2025-02-07
Payer: MEDICARE

## 2025-02-07 ENCOUNTER — OFFICE VISIT (OUTPATIENT)
Dept: HEMATOLOGY/ONCOLOGY | Facility: CLINIC | Age: 80
End: 2025-02-07
Payer: MEDICARE

## 2025-02-07 DIAGNOSIS — C78.7 CANCER, METASTATIC TO LIVER: ICD-10-CM

## 2025-02-07 DIAGNOSIS — C78.02 MALIGNANT NEOPLASM METASTATIC TO BOTH LUNGS: ICD-10-CM

## 2025-02-07 DIAGNOSIS — R53.83 CHEMOTHERAPY-INDUCED FATIGUE: ICD-10-CM

## 2025-02-07 DIAGNOSIS — K59.00 CONSTIPATION, UNSPECIFIED CONSTIPATION TYPE: ICD-10-CM

## 2025-02-07 DIAGNOSIS — T45.1X5A CHEMOTHERAPY-INDUCED FATIGUE: ICD-10-CM

## 2025-02-07 DIAGNOSIS — C19 COLORECTAL CANCER: Primary | ICD-10-CM

## 2025-02-07 DIAGNOSIS — C78.01 MALIGNANT NEOPLASM METASTATIC TO BOTH LUNGS: ICD-10-CM

## 2025-02-07 PROCEDURE — 1160F RVW MEDS BY RX/DR IN RCRD: CPT | Mod: CPTII,95,, | Performed by: INTERNAL MEDICINE

## 2025-02-07 PROCEDURE — 1159F MED LIST DOCD IN RCRD: CPT | Mod: CPTII,95,, | Performed by: INTERNAL MEDICINE

## 2025-02-07 PROCEDURE — G2211 COMPLEX E/M VISIT ADD ON: HCPCS | Mod: 95,,, | Performed by: INTERNAL MEDICINE

## 2025-02-07 PROCEDURE — 98007 SYNCH AUDIO-VIDEO EST HI 40: CPT | Mod: 95,,, | Performed by: INTERNAL MEDICINE

## 2025-02-07 NOTE — TELEPHONE ENCOUNTER
Spoke with Kalani, pt daughter.  Pt daughter stated that the [facility] cannot get the labs drawn due to pt being a difficult stick.  Pt daughter request that labs be drawn prior tpo treatment on Tuesday   Dr Khoobehi stated aht is fine

## 2025-02-07 NOTE — PROGRESS NOTES
Service Date:  2/7/25    Chief Complaint: colon cancer    Edil Goff is a 79 y.o. male here with metastatic colorectal cancer.  Patient has a history of right hemicolectomy with pathology showing a [id number] lesion.  Patient was then started on adjuvant FOLFOX, but had poor tolerance after 1 cycle.  The 5 FU bolus was dropped and oxaliplatin dose was lowered, but patient still continued to have poor tolerance had severe protein calorie malnutrition following chemotherapy.  Therefore it was decided to be held.  Patient then had 2 liver lesions biopsied positive to be metastatic colon cancer.  He was then started on Vectibix with no improvement in the lesions, which were ultimately treated with SIRT.  He has no longer been on Vectibix for a few months.    Here prior to cycle 6.  Doing okay.  Somewhat.  Biggest issue is constipation.  Has not gone in 3 days.  Taking MiraLax Colace and lactulose.  Does not drink lot of water.    Review of Systems   Constitutional: Negative.  Negative for appetite change and unexpected weight change.   HENT: Negative.  Negative for mouth sores.    Eyes: Negative.  Negative for visual disturbance.   Respiratory: Negative.  Negative for cough and shortness of breath.    Cardiovascular: Negative.  Negative for chest pain.   Gastrointestinal:  Positive for constipation. Negative for abdominal pain and diarrhea.   Endocrine: Negative.    Genitourinary:  Positive for frequency.   Musculoskeletal: Negative.  Negative for back pain.   Integumentary:  Negative for rash. Negative.   Neurological: Negative.  Negative for headaches.   Hematological: Negative.  Negative for adenopathy.   Psychiatric/Behavioral:  The patient is nervous/anxious.         Current Outpatient Medications   Medication Instructions    amLODIPine (NORVASC) 5 mg, Every morning    amoxicillin-clavulanate 875-125mg (AUGMENTIN) 875-125 mg per tablet 1 tablet, Oral, 2 times daily    ascorbic acid (VITAMIN C ORAL) Every morning     aspirin 325 mg, Daily    buPROPion (WELLBUTRIN XL) 300 mg, Nightly    droNABinol (MARINOL) 2.5 mg, Oral, 2 times daily before meals, Take 1 hour before lunch and dinner    ergocalciferol, vitamin D2, (VITAMIN D2 ORAL) Daily    EScitalopram oxalate (LEXAPRO) 10 mg, Every morning    ferrous sulfate (FEOSOL) 325 mg, Oral, Daily    lactulose (CHRONULAC) 20 g, Oral, 3 times daily    LIDOcaine-prilocaine (EMLA) cream Topical (Top), As needed (PRN)    LORazepam (ATIVAN) 1 mg, Oral, Every 8 hours PRN    losartan (COZAAR) 50 mg, Every morning    ondansetron (ZOFRAN) 8 mg, Oral, Every 8 hours PRN    prochlorperazine (COMPAZINE) 10 mg, Oral, Every 6 hours PRN    rivastigmine tartrate (EXELON) 1.5 mg, 2 times daily    traZODone (DESYREL) 50 mg, Nightly        Past Medical History:   Diagnosis Date    Cancer     colon    Hyperlipidemia     Hypertension     TIA (transient ischemic attack)         Past Surgical History:   Procedure Laterality Date    COLON SURGERY  2023    resection    TONSILLECTOMY          No family history on file.    Social History     Tobacco Use    Smoking status: Never    Smokeless tobacco: Never   Substance Use Topics    Alcohol use: Never    Drug use: Never         There were no vitals filed for this visit.           Physical Exam:  There were no vitals taken for this visit.    Physical Exam  Vitals and nursing note reviewed.   Constitutional:       Appearance: Normal appearance.   HENT:      Head: Normocephalic and atraumatic.      Nose: Nose normal.      Mouth/Throat:      Mouth: Mucous membranes are moist.      Pharynx: Oropharynx is clear.   Eyes:      Extraocular Movements: Extraocular movements intact.      Conjunctiva/sclera: Conjunctivae normal.   Cardiovascular:      Rate and Rhythm: Normal rate and regular rhythm.      Heart sounds: Normal heart sounds.   Pulmonary:      Effort: Pulmonary effort is normal.      Breath sounds: Normal breath sounds.   Abdominal:      General: Abdomen is flat.  Bowel sounds are normal.      Palpations: Abdomen is soft.   Musculoskeletal:         General: Normal range of motion.      Cervical back: Normal range of motion and neck supple.   Skin:     General: Skin is warm and dry.   Neurological:      General: No focal deficit present.      Mental Status: He is alert and oriented to person, place, and time. Mental status is at baseline.   Psychiatric:         Mood and Affect: Mood normal.          Labs:  Lab Results   Component Value Date    WBC 5.80 08/14/2024    RBC 4.00 (L) 08/14/2024    HGB 11.2 (L) 08/14/2024    HCT 36.1 (L) 08/14/2024    MCV 90 08/14/2024    MCH 28.0 08/14/2024    MCHC 31.0 (L) 08/14/2024    RDW 13.2 08/14/2024     08/14/2024    MPV 9.9 08/14/2024    GRAN 3.8 08/14/2024    GRAN 65.1 08/14/2024    LYMPH 1.2 08/14/2024    LYMPH 19.8 08/14/2024    MONO 0.7 08/14/2024    MONO 12.1 08/14/2024    EOS 0.2 08/14/2024    BASO 0.01 08/14/2024    EOSINOPHIL 2.6 08/14/2024    BASOPHIL 0.2 08/14/2024     Sodium   Date Value Ref Range Status   08/14/2024 139 136 - 145 mmol/L Final     Potassium   Date Value Ref Range Status   08/14/2024 4.3 3.5 - 5.1 mmol/L Final     Chloride   Date Value Ref Range Status   08/14/2024 106 95 - 110 mmol/L Final     CO2   Date Value Ref Range Status   08/14/2024 25 23 - 29 mmol/L Final     Glucose   Date Value Ref Range Status   08/14/2024 96 70 - 110 mg/dL Final     BUN   Date Value Ref Range Status   08/14/2024 19 8 - 23 mg/dL Final     Creatinine   Date Value Ref Range Status   08/14/2024 1.1 0.5 - 1.4 mg/dL Final     Calcium   Date Value Ref Range Status   08/14/2024 9.7 8.7 - 10.5 mg/dL Final     Total Protein   Date Value Ref Range Status   08/14/2024 6.4 6.0 - 8.4 g/dL Final     Albumin   Date Value Ref Range Status   08/14/2024 3.9 3.5 - 5.2 g/dL Final     Total Bilirubin   Date Value Ref Range Status   08/14/2024 0.8 0.1 - 1.0 mg/dL Final     Comment:     For infants and newborns, interpretation of results should be  based  on gestational age, weight and in agreement with clinical  observations.    Premature Infant recommended reference ranges:  Up to 24 hours.............<8.0 mg/dL  Up to 48 hours............<12.0 mg/dL  3-5 days..................<15.0 mg/dL  6-29 days.................<15.0 mg/dL       Alkaline Phosphatase   Date Value Ref Range Status   08/14/2024 55 55 - 135 U/L Final     AST   Date Value Ref Range Status   08/14/2024 19 10 - 40 U/L Final     ALT   Date Value Ref Range Status   08/14/2024 13 10 - 44 U/L Final     Anion Gap   Date Value Ref Range Status   08/14/2024 8 8 - 16 mmol/L Final       A/P:    Loss of appetite   -has been eating smaller meals.  Also taking dronabinol. Some improvement now.    Metastatic colon adenocarcinoma   -oligometastatic with 2 lesions in the liver treated with SIRT  -after initial visit with me, CT chest was done which shows numerous lung nodules suspicious for metastasis, not previously present  -pharmacogenomic shows that patient has a poor metabolizer of 5 FU, but after discussing with pharmacy, it can be given at 50% dosage  -okay for cycle 6 if labs OK.  Return to clinic in 2 weeks for cycle 7.  We will get scans as well.    Iron deficiency anemia   -improved with IV iron  -continue to monitor     Bone lesion  -seen on CT abdomen  -lesion on the L2 lumbar spine   -plan to repeat another scan    Constipation  -on Colace, Miralax, and Lactulose with no relief. Last went 3 days ago.      Aurash Khoobehi, MD  Hematology and Oncology    Visit today included increased complexity associated with the care of the episodic problem colon cancer addressed and managing the longitudinal care of the patient due to the serious and/or complex managed problem(s).

## 2025-02-10 ENCOUNTER — TELEPHONE (OUTPATIENT)
Dept: GASTROENTEROLOGY | Facility: CLINIC | Age: 80
End: 2025-02-10
Payer: MEDICARE

## 2025-02-10 ENCOUNTER — PATIENT MESSAGE (OUTPATIENT)
Dept: HEMATOLOGY/ONCOLOGY | Facility: CLINIC | Age: 80
End: 2025-02-10
Payer: MEDICARE

## 2025-02-10 NOTE — TELEPHONE ENCOUNTER
Spoke with daughter she states he already has a Gastroenterologist but she wants to check with her Mom to see if they want to switch him.

## 2025-02-11 ENCOUNTER — INFUSION (OUTPATIENT)
Dept: INFUSION THERAPY | Facility: HOSPITAL | Age: 80
End: 2025-02-11
Attending: INTERNAL MEDICINE
Payer: MEDICARE

## 2025-02-11 ENCOUNTER — LAB VISIT (OUTPATIENT)
Dept: LAB | Facility: HOSPITAL | Age: 80
End: 2025-02-11
Attending: INTERNAL MEDICINE
Payer: MEDICARE

## 2025-02-11 VITALS
TEMPERATURE: 98 F | HEART RATE: 59 BPM | OXYGEN SATURATION: 100 % | SYSTOLIC BLOOD PRESSURE: 163 MMHG | DIASTOLIC BLOOD PRESSURE: 77 MMHG | HEIGHT: 72 IN | WEIGHT: 152.13 LBS | BODY MASS INDEX: 20.6 KG/M2 | RESPIRATION RATE: 18 BRPM

## 2025-02-11 DIAGNOSIS — C19 COLORECTAL CANCER: Primary | ICD-10-CM

## 2025-02-11 DIAGNOSIS — C78.7 CANCER, METASTATIC TO LIVER: ICD-10-CM

## 2025-02-11 DIAGNOSIS — C78.02 MALIGNANT NEOPLASM METASTATIC TO BOTH LUNGS: ICD-10-CM

## 2025-02-11 DIAGNOSIS — C19 COLORECTAL CANCER: ICD-10-CM

## 2025-02-11 DIAGNOSIS — C78.01 MALIGNANT NEOPLASM METASTATIC TO BOTH LUNGS: ICD-10-CM

## 2025-02-11 LAB
ALBUMIN SERPL BCP-MCNC: 4 G/DL (ref 3.5–5.2)
ALP SERPL-CCNC: 48 U/L (ref 55–135)
ALT SERPL W/O P-5'-P-CCNC: 9 U/L (ref 10–44)
ANION GAP SERPL CALC-SCNC: 4 MMOL/L (ref 8–16)
AST SERPL-CCNC: 17 U/L (ref 10–40)
BASOPHILS # BLD AUTO: 0 K/UL (ref 0–0.2)
BASOPHILS NFR BLD: 0 % (ref 0–1.9)
BILIRUB SERPL-MCNC: 0.7 MG/DL (ref 0.1–1)
BUN SERPL-MCNC: 18 MG/DL (ref 8–23)
CALCIUM SERPL-MCNC: 9.5 MG/DL (ref 8.7–10.5)
CHLORIDE SERPL-SCNC: 107 MMOL/L (ref 95–110)
CO2 SERPL-SCNC: 29 MMOL/L (ref 23–29)
CREAT SERPL-MCNC: 1.3 MG/DL (ref 0.5–1.4)
DIFFERENTIAL METHOD BLD: ABNORMAL
EOSINOPHIL # BLD AUTO: 0.1 K/UL (ref 0–0.5)
EOSINOPHIL NFR BLD: 1.9 % (ref 0–8)
ERYTHROCYTE [DISTWIDTH] IN BLOOD BY AUTOMATED COUNT: 15.6 % (ref 11.5–14.5)
EST. GFR  (NO RACE VARIABLE): 56.6 ML/MIN/1.73 M^2
GLUCOSE SERPL-MCNC: 124 MG/DL (ref 70–110)
HCT VFR BLD AUTO: 38.4 % (ref 40–54)
HGB BLD-MCNC: 12.8 G/DL (ref 14–18)
IMM GRANULOCYTES # BLD AUTO: 0.01 K/UL (ref 0–0.04)
IMM GRANULOCYTES NFR BLD AUTO: 0.2 % (ref 0–0.5)
LYMPHOCYTES # BLD AUTO: 1.3 K/UL (ref 1–4.8)
LYMPHOCYTES NFR BLD: 21.8 % (ref 18–48)
MCH RBC QN AUTO: 30.8 PG (ref 27–31)
MCHC RBC AUTO-ENTMCNC: 33.3 G/DL (ref 32–36)
MCV RBC AUTO: 92 FL (ref 82–98)
MONOCYTES # BLD AUTO: 0.6 K/UL (ref 0.3–1)
MONOCYTES NFR BLD: 9.6 % (ref 4–15)
NEUTROPHILS # BLD AUTO: 3.8 K/UL (ref 1.8–7.7)
NEUTROPHILS NFR BLD: 66.5 % (ref 38–73)
NRBC BLD-RTO: 0 /100 WBC
PLATELET # BLD AUTO: 240 K/UL (ref 150–450)
PMV BLD AUTO: 8.1 FL (ref 9.2–12.9)
POTASSIUM SERPL-SCNC: 4.2 MMOL/L (ref 3.5–5.1)
PROT SERPL-MCNC: 6.6 G/DL (ref 6–8.4)
RBC # BLD AUTO: 4.16 M/UL (ref 4.6–6.2)
SODIUM SERPL-SCNC: 140 MMOL/L (ref 136–145)
WBC # BLD AUTO: 5.74 K/UL (ref 3.9–12.7)

## 2025-02-11 PROCEDURE — 96416 CHEMO PROLONG INFUSE W/PUMP: CPT

## 2025-02-11 PROCEDURE — 85025 COMPLETE CBC W/AUTO DIFF WBC: CPT | Performed by: INTERNAL MEDICINE

## 2025-02-11 PROCEDURE — 80053 COMPREHEN METABOLIC PANEL: CPT | Performed by: INTERNAL MEDICINE

## 2025-02-11 PROCEDURE — 25000003 PHARM REV CODE 250: Performed by: NURSE PRACTITIONER

## 2025-02-11 PROCEDURE — 96366 THER/PROPH/DIAG IV INF ADDON: CPT

## 2025-02-11 PROCEDURE — 96413 CHEMO IV INFUSION 1 HR: CPT

## 2025-02-11 PROCEDURE — 63600175 PHARM REV CODE 636 W HCPCS: Performed by: NURSE PRACTITIONER

## 2025-02-11 PROCEDURE — 36415 COLL VENOUS BLD VENIPUNCTURE: CPT | Performed by: INTERNAL MEDICINE

## 2025-02-11 PROCEDURE — 96367 TX/PROPH/DG ADDL SEQ IV INF: CPT

## 2025-02-11 RX ORDER — SODIUM CHLORIDE 0.9 % (FLUSH) 0.9 %
10 SYRINGE (ML) INJECTION
Status: CANCELLED | OUTPATIENT
Start: 2025-02-11

## 2025-02-11 RX ORDER — EPINEPHRINE 0.3 MG/.3ML
0.3 INJECTION SUBCUTANEOUS ONCE AS NEEDED
Status: CANCELLED | OUTPATIENT
Start: 2025-02-11

## 2025-02-11 RX ORDER — HEPARIN 100 UNIT/ML
500 SYRINGE INTRAVENOUS
Status: CANCELLED | OUTPATIENT
Start: 2025-02-11

## 2025-02-11 RX ORDER — ONDANSETRON HYDROCHLORIDE 2 MG/ML
8 INJECTION, SOLUTION INTRAVENOUS
Status: CANCELLED | OUTPATIENT
Start: 2025-02-11

## 2025-02-11 RX ORDER — EPINEPHRINE 0.3 MG/.3ML
0.3 INJECTION SUBCUTANEOUS ONCE AS NEEDED
Status: DISCONTINUED | OUTPATIENT
Start: 2025-02-11 | End: 2025-02-11 | Stop reason: HOSPADM

## 2025-02-11 RX ORDER — PROCHLORPERAZINE EDISYLATE 5 MG/ML
5 INJECTION INTRAMUSCULAR; INTRAVENOUS ONCE AS NEEDED
Status: CANCELLED | OUTPATIENT
Start: 2025-02-11

## 2025-02-11 RX ORDER — HEPARIN 100 UNIT/ML
500 SYRINGE INTRAVENOUS
Status: CANCELLED | OUTPATIENT
Start: 2025-02-13

## 2025-02-11 RX ORDER — PROCHLORPERAZINE EDISYLATE 5 MG/ML
5 INJECTION INTRAMUSCULAR; INTRAVENOUS ONCE AS NEEDED
Status: CANCELLED | OUTPATIENT
Start: 2025-02-13

## 2025-02-11 RX ORDER — ONDANSETRON HCL IN 0.9 % NACL 8 MG/50 ML
8 INTRAVENOUS SOLUTION, PIGGYBACK (ML) INTRAVENOUS
Status: COMPLETED | OUTPATIENT
Start: 2025-02-11 | End: 2025-02-11

## 2025-02-11 RX ORDER — PROCHLORPERAZINE EDISYLATE 5 MG/ML
5 INJECTION INTRAMUSCULAR; INTRAVENOUS ONCE AS NEEDED
Status: DISCONTINUED | OUTPATIENT
Start: 2025-02-11 | End: 2025-02-11 | Stop reason: HOSPADM

## 2025-02-11 RX ORDER — DIPHENHYDRAMINE HYDROCHLORIDE 50 MG/ML
50 INJECTION INTRAMUSCULAR; INTRAVENOUS ONCE AS NEEDED
Status: CANCELLED | OUTPATIENT
Start: 2025-02-11

## 2025-02-11 RX ORDER — SODIUM CHLORIDE 0.9 % (FLUSH) 0.9 %
10 SYRINGE (ML) INJECTION
Status: DISCONTINUED | OUTPATIENT
Start: 2025-02-11 | End: 2025-02-11 | Stop reason: HOSPADM

## 2025-02-11 RX ORDER — DIPHENHYDRAMINE HYDROCHLORIDE 50 MG/ML
50 INJECTION INTRAMUSCULAR; INTRAVENOUS ONCE AS NEEDED
Status: DISCONTINUED | OUTPATIENT
Start: 2025-02-11 | End: 2025-02-11 | Stop reason: HOSPADM

## 2025-02-11 RX ORDER — SODIUM CHLORIDE 0.9 % (FLUSH) 0.9 %
10 SYRINGE (ML) INJECTION
Status: CANCELLED | OUTPATIENT
Start: 2025-02-13

## 2025-02-11 RX ADMIN — SODIUM CHLORIDE 385 MG: 9 INJECTION, SOLUTION INTRAVENOUS at 09:02

## 2025-02-11 RX ADMIN — FLUOROURACIL 2365 MG: 50 INJECTION, SOLUTION INTRAVENOUS at 12:02

## 2025-02-11 RX ADMIN — SODIUM CHLORIDE: 900 INJECTION, SOLUTION INTRAVENOUS at 09:02

## 2025-02-11 RX ADMIN — DEXTROSE MONOHYDRATE 790 MG: 50 INJECTION, SOLUTION INTRAVENOUS at 10:02

## 2025-02-11 RX ADMIN — SODIUM CHLORIDE 8 MG: 9 INJECTION, SOLUTION INTRAVENOUS at 09:02

## 2025-02-11 NOTE — PLAN OF CARE
Problem: Fatigue  Goal: Improved Activity Tolerance  Outcome: Progressing  Intervention: Promote Improved Energy  Flowsheets (Taken 2/11/2025 0905)  Fatigue Management:   fatigue-related activity identified   paced activity encouraged   frequent rest breaks encouraged  Sleep/Rest Enhancement:   noise level reduced   relaxation techniques promoted   regular sleep/rest pattern promoted  Activity Management:   Ambulated -L4   Up in chair - L3  Environmental Support:   calm environment promoted   distractions minimized   rest periods encouraged

## 2025-02-13 ENCOUNTER — INFUSION (OUTPATIENT)
Dept: INFUSION THERAPY | Facility: HOSPITAL | Age: 80
End: 2025-02-13
Attending: INTERNAL MEDICINE
Payer: MEDICARE

## 2025-02-13 ENCOUNTER — PATIENT MESSAGE (OUTPATIENT)
Dept: HEMATOLOGY/ONCOLOGY | Facility: CLINIC | Age: 80
End: 2025-02-13
Payer: MEDICARE

## 2025-02-13 ENCOUNTER — TELEPHONE (OUTPATIENT)
Dept: HEMATOLOGY/ONCOLOGY | Facility: CLINIC | Age: 80
End: 2025-02-13
Payer: MEDICARE

## 2025-02-13 VITALS
DIASTOLIC BLOOD PRESSURE: 60 MMHG | SYSTOLIC BLOOD PRESSURE: 115 MMHG | BODY MASS INDEX: 20.82 KG/M2 | HEIGHT: 72 IN | HEART RATE: 51 BPM | WEIGHT: 153.69 LBS | TEMPERATURE: 97 F | OXYGEN SATURATION: 100 % | RESPIRATION RATE: 18 BRPM

## 2025-02-13 DIAGNOSIS — R45.89 ANXIETY ABOUT HEALTH: Primary | ICD-10-CM

## 2025-02-13 DIAGNOSIS — C19 COLORECTAL CANCER: ICD-10-CM

## 2025-02-13 PROCEDURE — A4216 STERILE WATER/SALINE, 10 ML: HCPCS | Performed by: NURSE PRACTITIONER

## 2025-02-13 PROCEDURE — 25000003 PHARM REV CODE 250: Performed by: NURSE PRACTITIONER

## 2025-02-13 PROCEDURE — 63600175 PHARM REV CODE 636 W HCPCS: Performed by: NURSE PRACTITIONER

## 2025-02-13 PROCEDURE — 96374 THER/PROPH/DIAG INJ IV PUSH: CPT

## 2025-02-13 RX ORDER — SODIUM CHLORIDE 0.9 % (FLUSH) 0.9 %
10 SYRINGE (ML) INJECTION
Status: DISCONTINUED | OUTPATIENT
Start: 2025-02-13 | End: 2025-02-13 | Stop reason: HOSPADM

## 2025-02-13 RX ORDER — LORAZEPAM 2 MG/ML
0.5 INJECTION INTRAMUSCULAR
Status: DISPENSED | OUTPATIENT
Start: 2025-02-13

## 2025-02-13 RX ORDER — HEPARIN 100 UNIT/ML
500 SYRINGE INTRAVENOUS
Status: DISCONTINUED | OUTPATIENT
Start: 2025-02-13 | End: 2025-02-13 | Stop reason: HOSPADM

## 2025-02-13 RX ADMIN — SODIUM CHLORIDE, PRESERVATIVE FREE 10 ML: 5 INJECTION INTRAVENOUS at 11:02

## 2025-02-13 RX ADMIN — HEPARIN 500 UNITS: 100 SYRINGE at 11:02

## 2025-02-13 RX ADMIN — LORAZEPAM 0.5 MG: 2 INJECTION INTRAMUSCULAR; INTRAVENOUS at 11:02

## 2025-02-13 NOTE — NURSING
"Pt came into infusion center for chemo pump disconnect. Pt stated pt became very anxious before coming to get chemo pump disconnected. Pt's wife gave pt prescribed ativan 0.5mg tablet at home at 0923. Pt still very anxious upon coming into infusion center. Pt and spouse asked for IV ativan. Miguelina Yung NP provider on-call for the day, notified of above situation. Miguelina Yugn NP gave orders for ativan 0.5mg IV push. Ativan 0.5mg IV push administered to pt. Pt ate a snack, went to the bathroom and then discharged home with spouse at side. Pt talkative and stated he "feels much better and relaxed". Pt walked out of infusion center with spouse at side. Informed pt and wife to call MD office with any further issues or go to the nearest ED for further evaluation.   "

## 2025-02-17 ENCOUNTER — TELEPHONE (OUTPATIENT)
Dept: RADIOLOGY | Facility: HOSPITAL | Age: 80
End: 2025-02-17

## 2025-02-17 ENCOUNTER — SOCIAL WORK (OUTPATIENT)
Dept: HEMATOLOGY/ONCOLOGY | Facility: CLINIC | Age: 80
End: 2025-02-17
Payer: MEDICARE

## 2025-02-17 NOTE — PROGRESS NOTES
I spoke to patient's daughter to provide her with the numbers to apply for [facility] Financial assistance.

## 2025-02-18 ENCOUNTER — PATIENT MESSAGE (OUTPATIENT)
Dept: HEMATOLOGY/ONCOLOGY | Facility: CLINIC | Age: 80
End: 2025-02-18
Payer: MEDICARE

## 2025-02-18 ENCOUNTER — HOSPITAL ENCOUNTER (OUTPATIENT)
Dept: RADIOLOGY | Facility: HOSPITAL | Age: 80
Discharge: HOME | End: 2025-02-18
Attending: INTERNAL MEDICINE
Payer: MEDICARE

## 2025-02-18 DIAGNOSIS — C78.7 CANCER, METASTATIC TO LIVER: ICD-10-CM

## 2025-02-18 DIAGNOSIS — C19 COLORECTAL CANCER: ICD-10-CM

## 2025-02-18 DIAGNOSIS — C78.02 MALIGNANT NEOPLASM METASTATIC TO BOTH LUNGS: ICD-10-CM

## 2025-02-18 DIAGNOSIS — C78.01 MALIGNANT NEOPLASM METASTATIC TO BOTH LUNGS: ICD-10-CM

## 2025-02-18 PROCEDURE — 71260 CT THORAX DX C+: CPT | Mod: TC

## 2025-02-18 PROCEDURE — 25500020 PHARM REV CODE 255

## 2025-02-18 RX ADMIN — IOHEXOL 75 ML: 350 INJECTION, SOLUTION INTRAVENOUS at 09:02

## 2025-02-20 ENCOUNTER — TELEPHONE (OUTPATIENT)
Dept: HEMATOLOGY/ONCOLOGY | Facility: CLINIC | Age: 80
End: 2025-02-20
Payer: MEDICARE

## 2025-02-20 ENCOUNTER — PATIENT MESSAGE (OUTPATIENT)
Dept: HEMATOLOGY/ONCOLOGY | Facility: CLINIC | Age: 80
End: 2025-02-20
Payer: MEDICARE

## 2025-02-20 NOTE — TELEPHONE ENCOUNTER
Reached answering service   asked to have msg sent to [facility] to return call in ref to demographics        ----- Message from [place]en sent at 2/20/2025  2:06 PM CST -----  Paperwork was faxed today.... and not all of the information seemed to be in the papers that [facility] received. [phone] [place] business managerDemographics were missing

## 2025-02-20 NOTE — TELEPHONE ENCOUNTER
----- Message from Aspen sent at 2/20/2025  2:06 PM CST -----  Paperwork was faxed today.... and not all of the information seemed to be in the papers that [facility] received. [phone] [place] business managerDemographics were missing

## 2025-02-21 DIAGNOSIS — C19 COLORECTAL CANCER: Primary | ICD-10-CM

## 2025-02-24 ENCOUNTER — TELEPHONE (OUTPATIENT)
Dept: INTERVENTIONAL RADIOLOGY/VASCULAR | Facility: CLINIC | Age: 80
End: 2025-02-24
Payer: MEDICARE

## 2025-02-24 ENCOUNTER — OFFICE VISIT (OUTPATIENT)
Dept: HEMATOLOGY/ONCOLOGY | Facility: CLINIC | Age: 80
End: 2025-02-24
Payer: MEDICARE

## 2025-02-24 ENCOUNTER — PATIENT MESSAGE (OUTPATIENT)
Dept: HEMATOLOGY/ONCOLOGY | Facility: CLINIC | Age: 80
End: 2025-02-24

## 2025-02-24 DIAGNOSIS — C78.02 MALIGNANT NEOPLASM METASTATIC TO BOTH LUNGS: ICD-10-CM

## 2025-02-24 DIAGNOSIS — T45.1X5A CHEMOTHERAPY-INDUCED FATIGUE: ICD-10-CM

## 2025-02-24 DIAGNOSIS — C78.01 MALIGNANT NEOPLASM METASTATIC TO BOTH LUNGS: ICD-10-CM

## 2025-02-24 DIAGNOSIS — M89.9 BONE LESION: ICD-10-CM

## 2025-02-24 DIAGNOSIS — R63.0 LOSS OF APPETITE: ICD-10-CM

## 2025-02-24 DIAGNOSIS — R41.0 DISORIENTATION: ICD-10-CM

## 2025-02-24 DIAGNOSIS — R53.83 CHEMOTHERAPY-INDUCED FATIGUE: ICD-10-CM

## 2025-02-24 DIAGNOSIS — C19 COLORECTAL CANCER: Primary | ICD-10-CM

## 2025-02-24 DIAGNOSIS — C78.7 CANCER, METASTATIC TO LIVER: ICD-10-CM

## 2025-02-24 PROCEDURE — G2211 COMPLEX E/M VISIT ADD ON: HCPCS | Mod: 95,,, | Performed by: INTERNAL MEDICINE

## 2025-02-24 PROCEDURE — 98006 SYNCH AUDIO-VIDEO EST MOD 30: CPT | Mod: 95,,, | Performed by: INTERNAL MEDICINE

## 2025-02-24 NOTE — PROGRESS NOTES
Service Date:  2/24/25    Chief Complaint: colon cancer    Edil Goff is a 79 y.o. male here with metastatic colorectal cancer.  Patient has a history of right hemicolectomy with pathology showing a [id number] lesion.  Patient was then started on adjuvant FOLFOX, but had poor tolerance after 1 cycle.  The 5 FU bolus was dropped and oxaliplatin dose was lowered, but patient still continued to have poor tolerance had severe protein calorie malnutrition following chemotherapy.  Therefore it was decided to be held.  Patient then had 2 liver lesions biopsied positive to be metastatic colon cancer.  He was then started on Vectibix with no improvement in the lesions, which were ultimately treated with SIRT.  He has no longer been on Vectibix for a few months.    Here for chemo clearance but family states he is not doing well.  Has been very confused the last week.  Having weakness.  Nonsensical talk.  Does not answer questions.  Ativan was helping before this with agitation but family states it is no longer helping    Review of Systems   Constitutional: Negative.  Negative for appetite change and unexpected weight change.   HENT: Negative.  Negative for mouth sores.    Eyes: Negative.  Negative for visual disturbance.   Respiratory: Negative.  Negative for cough and shortness of breath.    Cardiovascular: Negative.  Negative for chest pain.   Gastrointestinal:  Positive for constipation. Negative for abdominal pain and diarrhea.   Endocrine: Negative.    Genitourinary:  Positive for frequency.   Musculoskeletal: Negative.  Negative for back pain.   Integumentary:  Negative for rash. Negative.   Neurological: Negative.  Negative for headaches.   Hematological: Negative.  Negative for adenopathy.   Psychiatric/Behavioral:  Positive for behavioral problems and confusion. The patient is nervous/anxious.         Current Outpatient Medications   Medication Instructions    amLODIPine (NORVASC) 5 mg, Every morning     amoxicillin-clavulanate 875-125mg (AUGMENTIN) 875-125 mg per tablet 1 tablet, Oral, 2 times daily    ascorbic acid (VITAMIN C ORAL) Every morning    aspirin 325 mg, Daily    buPROPion (WELLBUTRIN XL) 300 mg, Nightly    droNABinol (MARINOL) 2.5 mg, Oral, 2 times daily before meals, Take 1 hour before lunch and dinner    ergocalciferol, vitamin D2, (VITAMIN D2 ORAL) Daily    EScitalopram oxalate (LEXAPRO) 10 mg, Every morning    lactulose (CHRONULAC) 20 g, Oral, 3 times daily    LIDOcaine-prilocaine (EMLA) cream Topical (Top), As needed (PRN)    LORazepam (ATIVAN) 1 mg, Oral, Every 8 hours PRN    losartan (COZAAR) 50 mg, Every morning    ondansetron (ZOFRAN) 8 mg, Oral, Every 8 hours PRN    prochlorperazine (COMPAZINE) 10 mg, Oral, Every 6 hours PRN    rivastigmine tartrate (EXELON) 1.5 mg, 2 times daily    traZODone (DESYREL) 50 mg, Nightly        Past Medical History:   Diagnosis Date    Cancer     colon    Hyperlipidemia     Hypertension     TIA (transient ischemic attack)         Past Surgical History:   Procedure Laterality Date    COLON SURGERY  2023    resection    TONSILLECTOMY          No family history on file.    Social History     Tobacco Use    Smoking status: Never    Smokeless tobacco: Never   Substance Use Topics    Alcohol use: Never    Drug use: Never         There were no vitals filed for this visit.           Physical Exam:  There were no vitals taken for this visit.    Physical Exam  Vitals and nursing note reviewed.   Constitutional:       Appearance: Normal appearance.   HENT:      Head: Normocephalic and atraumatic.      Nose: Nose normal.      Mouth/Throat:      Mouth: Mucous membranes are moist.      Pharynx: Oropharynx is clear.   Eyes:      Extraocular Movements: Extraocular movements intact.      Conjunctiva/sclera: Conjunctivae normal.   Cardiovascular:      Rate and Rhythm: Normal rate and regular rhythm.      Heart sounds: Normal heart sounds.   Pulmonary:      Effort: Pulmonary effort  is normal.      Breath sounds: Normal breath sounds.   Abdominal:      General: Abdomen is flat. Bowel sounds are normal.      Palpations: Abdomen is soft.   Musculoskeletal:         General: Normal range of motion.      Cervical back: Normal range of motion and neck supple.   Skin:     General: Skin is warm and dry.   Neurological:      Mental Status: He is alert. He is disoriented and confused.   Psychiatric:         Mood and Affect: Mood normal.          Labs:  Lab Results   Component Value Date    WBC 5.74 02/11/2025    RBC 4.16 (L) 02/11/2025    HGB 12.8 (L) 02/11/2025    HCT 38.4 (L) 02/11/2025    MCV 92 02/11/2025    MCH 30.8 02/11/2025    MCHC 33.3 02/11/2025    RDW 15.6 (H) 02/11/2025     02/11/2025    MPV 8.1 (L) 02/11/2025    GRAN 3.8 02/11/2025    GRAN 66.5 02/11/2025    LYMPH 1.3 02/11/2025    LYMPH 21.8 02/11/2025    MONO 0.6 02/11/2025    MONO 9.6 02/11/2025    EOS 0.1 02/11/2025    BASO 0.00 02/11/2025    EOSINOPHIL 1.9 02/11/2025    BASOPHIL 0.0 02/11/2025     Sodium   Date Value Ref Range Status   02/11/2025 140 136 - 145 mmol/L Final     Potassium   Date Value Ref Range Status   02/11/2025 4.2 3.5 - 5.1 mmol/L Final     Chloride   Date Value Ref Range Status   02/11/2025 107 95 - 110 mmol/L Final     CO2   Date Value Ref Range Status   02/11/2025 29 23 - 29 mmol/L Final     Glucose   Date Value Ref Range Status   02/11/2025 124 (H) 70 - 110 mg/dL Final     BUN   Date Value Ref Range Status   02/11/2025 18 8 - 23 mg/dL Final     Creatinine   Date Value Ref Range Status   02/11/2025 1.3 0.5 - 1.4 mg/dL Final     Calcium   Date Value Ref Range Status   02/11/2025 9.5 8.7 - 10.5 mg/dL Final     Total Protein   Date Value Ref Range Status   02/11/2025 6.6 6.0 - 8.4 g/dL Final     Albumin   Date Value Ref Range Status   02/11/2025 4.0 3.5 - 5.2 g/dL Final     Total Bilirubin   Date Value Ref Range Status   02/11/2025 0.7 0.1 - 1.0 mg/dL Final     Comment:     For infants and newborns,  interpretation of results should be based  on gestational age, weight and in agreement with clinical  observations.    Premature Infant recommended reference ranges:  Up to 24 hours.............<8.0 mg/dL  Up to 48 hours............<12.0 mg/dL  3-5 days..................<15.0 mg/dL  6-29 days.................<15.0 mg/dL       Alkaline Phosphatase   Date Value Ref Range Status   02/11/2025 48 (L) 55 - 135 U/L Final     AST   Date Value Ref Range Status   02/11/2025 17 10 - 40 U/L Final     ALT   Date Value Ref Range Status   02/11/2025 9 (L) 10 - 44 U/L Final     Anion Gap   Date Value Ref Range Status   02/11/2025 4 (L) 8 - 16 mmol/L Final       A/P:    Loss of appetite   -has been eating smaller meals.  Stopped dronabinol as it was not working.    Metastatic colon adenocarcinoma   -oligometastatic with 2 lesions in the liver treated with SIRT  -after initial visit with me, CT chest was done which shows numerous lung nodules suspicious for metastasis, not previously present  -pharmacogenomic shows that patient has a poor metabolizer of 5 FU, but after discussing with pharmacy, it can be given at 50% dosage  -hold cycle 7 of treatment due to altered mental status     Altered mental status   -maybe from polypharmacy   -patient will see PCP   -I did recommend going to the ER but they want to wait to see their PCP 1st    Iron deficiency anemia   -improved with IV iron  -continue to monitor     Bone lesion  -seen on CT abdomen  -lesion on the L2 lumbar spine   -latest CT scan shows overall stability      Aurash Khoobehi, MD  Hematology and Oncology    Visit today included increased complexity associated with the care of the episodic problem colon cancer addressed and managing the longitudinal care of the patient due to the serious and/or complex managed problem(s).

## 2025-02-27 ENCOUNTER — PATIENT MESSAGE (OUTPATIENT)
Dept: HEMATOLOGY/ONCOLOGY | Facility: CLINIC | Age: 80
End: 2025-02-27
Payer: MEDICARE

## 2025-03-03 ENCOUNTER — OFFICE VISIT (OUTPATIENT)
Dept: HEMATOLOGY/ONCOLOGY | Facility: CLINIC | Age: 80
End: 2025-03-03
Payer: MEDICARE

## 2025-03-03 ENCOUNTER — PATIENT MESSAGE (OUTPATIENT)
Dept: HEMATOLOGY/ONCOLOGY | Facility: CLINIC | Age: 80
End: 2025-03-03

## 2025-03-03 DIAGNOSIS — C19 COLORECTAL CANCER: Primary | ICD-10-CM

## 2025-03-03 DIAGNOSIS — T45.1X5A CHEMOTHERAPY-INDUCED FATIGUE: ICD-10-CM

## 2025-03-03 DIAGNOSIS — M89.9 BONE LESION: ICD-10-CM

## 2025-03-03 DIAGNOSIS — C78.01 MALIGNANT NEOPLASM METASTATIC TO BOTH LUNGS: ICD-10-CM

## 2025-03-03 DIAGNOSIS — C78.02 MALIGNANT NEOPLASM METASTATIC TO BOTH LUNGS: ICD-10-CM

## 2025-03-03 DIAGNOSIS — R63.0 LOSS OF APPETITE: ICD-10-CM

## 2025-03-03 DIAGNOSIS — C78.7 CANCER, METASTATIC TO LIVER: ICD-10-CM

## 2025-03-03 DIAGNOSIS — R53.83 CHEMOTHERAPY-INDUCED FATIGUE: ICD-10-CM

## 2025-03-03 PROCEDURE — G2211 COMPLEX E/M VISIT ADD ON: HCPCS | Mod: 95,,, | Performed by: INTERNAL MEDICINE

## 2025-03-03 PROCEDURE — 98007 SYNCH AUDIO-VIDEO EST HI 40: CPT | Mod: 95,,, | Performed by: INTERNAL MEDICINE

## 2025-03-03 RX ORDER — SODIUM CHLORIDE 0.9 % (FLUSH) 0.9 %
10 SYRINGE (ML) INJECTION
Status: CANCELLED | OUTPATIENT
Start: 2025-03-05

## 2025-03-03 RX ORDER — SODIUM CHLORIDE 0.9 % (FLUSH) 0.9 %
10 SYRINGE (ML) INJECTION
Status: CANCELLED | OUTPATIENT
Start: 2025-03-07

## 2025-03-03 RX ORDER — EPINEPHRINE 0.3 MG/.3ML
0.3 INJECTION SUBCUTANEOUS ONCE AS NEEDED
Status: CANCELLED | OUTPATIENT
Start: 2025-03-05

## 2025-03-03 RX ORDER — PROCHLORPERAZINE EDISYLATE 5 MG/ML
5 INJECTION INTRAMUSCULAR; INTRAVENOUS ONCE AS NEEDED
Status: CANCELLED | OUTPATIENT
Start: 2025-03-05

## 2025-03-03 RX ORDER — ONDANSETRON HYDROCHLORIDE 2 MG/ML
8 INJECTION, SOLUTION INTRAVENOUS
Status: CANCELLED | OUTPATIENT
Start: 2025-03-05

## 2025-03-03 RX ORDER — HEPARIN 100 UNIT/ML
500 SYRINGE INTRAVENOUS
Status: CANCELLED | OUTPATIENT
Start: 2025-03-07

## 2025-03-03 RX ORDER — PROCHLORPERAZINE EDISYLATE 5 MG/ML
5 INJECTION INTRAMUSCULAR; INTRAVENOUS ONCE AS NEEDED
Status: CANCELLED | OUTPATIENT
Start: 2025-03-07

## 2025-03-03 RX ORDER — DIPHENHYDRAMINE HYDROCHLORIDE 50 MG/ML
50 INJECTION INTRAMUSCULAR; INTRAVENOUS ONCE AS NEEDED
Status: CANCELLED | OUTPATIENT
Start: 2025-03-05

## 2025-03-03 RX ORDER — HEPARIN 100 UNIT/ML
500 SYRINGE INTRAVENOUS
Status: CANCELLED | OUTPATIENT
Start: 2025-03-05

## 2025-03-03 NOTE — PROGRESS NOTES
Service Date:  3/3/25    Chief Complaint: colon cancer    Edil Goff is a 79 y.o. male here with metastatic colorectal cancer.  Patient has a history of right hemicolectomy with pathology showing a [id number] lesion.  Patient was then started on adjuvant FOLFOX, but had poor tolerance after 1 cycle.  The 5 FU bolus was dropped and oxaliplatin dose was lowered, but patient still continued to have poor tolerance had severe protein calorie malnutrition following chemotherapy.  Therefore it was decided to be held.  Patient then had 2 liver lesions biopsied positive to be metastatic colon cancer.  He was then started on Vectibix with no improvement in the lesions, which were ultimately treated with SIRT.  He has no longer been on Vectibix for a few months.    Here for chemo clearance.  Went to the ER after my last visit with him.  Doing much better now.  Workup in the ER was negative and patient was started on Abilify which helped a lot with his anxiety.  He also still takes his Ativan as needed.      Review of Systems   Constitutional: Negative.  Negative for appetite change and unexpected weight change.   HENT: Negative.  Negative for mouth sores.    Eyes: Negative.  Negative for visual disturbance.   Respiratory: Negative.  Negative for cough and shortness of breath.    Cardiovascular: Negative.  Negative for chest pain.   Gastrointestinal:  Positive for constipation. Negative for abdominal pain and diarrhea.   Endocrine: Negative.    Genitourinary:  Positive for frequency.   Musculoskeletal: Negative.  Negative for back pain.   Integumentary:  Negative for rash. Negative.   Neurological: Negative.  Negative for headaches.   Hematological: Negative.  Negative for adenopathy.   Psychiatric/Behavioral:  Positive for behavioral problems and confusion. The patient is nervous/anxious.         Current Outpatient Medications   Medication Instructions    amLODIPine (NORVASC) 5 mg, Every morning    amoxicillin-clavulanate  875-125mg (AUGMENTIN) 875-125 mg per tablet 1 tablet, Oral, 2 times daily    ascorbic acid (VITAMIN C ORAL) Every morning    aspirin 325 mg, Daily    buPROPion (WELLBUTRIN XL) 300 mg, Nightly    droNABinol (MARINOL) 2.5 mg, Oral, 2 times daily before meals, Take 1 hour before lunch and dinner    ergocalciferol, vitamin D2, (VITAMIN D2 ORAL) Daily    EScitalopram oxalate (LEXAPRO) 10 mg, Every morning    lactulose (CHRONULAC) 20 g, Oral, 3 times daily    LIDOcaine-prilocaine (EMLA) cream Topical (Top), As needed (PRN)    LORazepam (ATIVAN) 1 mg, Oral, Every 8 hours PRN    losartan (COZAAR) 50 mg, Every morning    ondansetron (ZOFRAN) 8 mg, Oral, Every 8 hours PRN    prochlorperazine (COMPAZINE) 10 mg, Oral, Every 6 hours PRN    rivastigmine tartrate (EXELON) 1.5 mg, 2 times daily    traZODone (DESYREL) 50 mg, Nightly        Past Medical History:   Diagnosis Date    Cancer     colon    Hyperlipidemia     Hypertension     TIA (transient ischemic attack)         Past Surgical History:   Procedure Laterality Date    COLON SURGERY  2023    resection    TONSILLECTOMY          No family history on file.    Social History     Tobacco Use    Smoking status: Never    Smokeless tobacco: Never   Substance Use Topics    Alcohol use: Never    Drug use: Never         There were no vitals filed for this visit.           Physical Exam:  There were no vitals taken for this visit.    Physical Exam  Vitals and nursing note reviewed.   Constitutional:       Appearance: Normal appearance.   HENT:      Head: Normocephalic and atraumatic.      Nose: Nose normal.      Mouth/Throat:      Mouth: Mucous membranes are moist.      Pharynx: Oropharynx is clear.   Eyes:      Extraocular Movements: Extraocular movements intact.      Conjunctiva/sclera: Conjunctivae normal.   Cardiovascular:      Rate and Rhythm: Normal rate and regular rhythm.      Heart sounds: Normal heart sounds.   Pulmonary:      Effort: Pulmonary effort is normal.      Breath  sounds: Normal breath sounds.   Abdominal:      General: Abdomen is flat. Bowel sounds are normal.      Palpations: Abdomen is soft.   Musculoskeletal:         General: Normal range of motion.      Cervical back: Normal range of motion and neck supple.   Skin:     General: Skin is warm and dry.   Neurological:      Mental Status: He is alert. He is disoriented and confused.   Psychiatric:         Mood and Affect: Mood normal.          Labs:  Lab Results   Component Value Date    WBC 5.74 02/11/2025    RBC 4.16 (L) 02/11/2025    HGB 12.8 (L) 02/11/2025    HCT 38.4 (L) 02/11/2025    MCV 92 02/11/2025    MCH 30.8 02/11/2025    MCHC 33.3 02/11/2025    RDW 15.6 (H) 02/11/2025     02/11/2025    MPV 8.1 (L) 02/11/2025    GRAN 3.8 02/11/2025    GRAN 66.5 02/11/2025    LYMPH 1.3 02/11/2025    LYMPH 21.8 02/11/2025    MONO 0.6 02/11/2025    MONO 9.6 02/11/2025    EOS 0.1 02/11/2025    BASO 0.00 02/11/2025    EOSINOPHIL 1.9 02/11/2025    BASOPHIL 0.0 02/11/2025     Sodium   Date Value Ref Range Status   02/11/2025 140 136 - 145 mmol/L Final     Potassium   Date Value Ref Range Status   02/11/2025 4.2 3.5 - 5.1 mmol/L Final     Chloride   Date Value Ref Range Status   02/11/2025 107 95 - 110 mmol/L Final     CO2   Date Value Ref Range Status   02/11/2025 29 23 - 29 mmol/L Final     Glucose   Date Value Ref Range Status   02/11/2025 124 (H) 70 - 110 mg/dL Final     BUN   Date Value Ref Range Status   02/11/2025 18 8 - 23 mg/dL Final     Creatinine   Date Value Ref Range Status   02/11/2025 1.3 0.5 - 1.4 mg/dL Final     Calcium   Date Value Ref Range Status   02/11/2025 9.5 8.7 - 10.5 mg/dL Final     Total Protein   Date Value Ref Range Status   02/11/2025 6.6 6.0 - 8.4 g/dL Final     Albumin   Date Value Ref Range Status   02/11/2025 4.0 3.5 - 5.2 g/dL Final     Total Bilirubin   Date Value Ref Range Status   02/11/2025 0.7 0.1 - 1.0 mg/dL Final     Comment:     For infants and newborns, interpretation of results should  be based  on gestational age, weight and in agreement with clinical  observations.    Premature Infant recommended reference ranges:  Up to 24 hours.............<8.0 mg/dL  Up to 48 hours............<12.0 mg/dL  3-5 days..................<15.0 mg/dL  6-29 days.................<15.0 mg/dL       Alkaline Phosphatase   Date Value Ref Range Status   02/11/2025 48 (L) 55 - 135 U/L Final     AST   Date Value Ref Range Status   02/11/2025 17 10 - 40 U/L Final     ALT   Date Value Ref Range Status   02/11/2025 9 (L) 10 - 44 U/L Final     Anion Gap   Date Value Ref Range Status   02/11/2025 4 (L) 8 - 16 mmol/L Final       A/P:    Loss of appetite   -improving as well now that he is on the Abilify.  Stopped dronabinol    Metastatic colon adenocarcinoma   -oligometastatic with 2 lesions in the liver treated with SIRT  -after initial visit with me, CT chest was done which shows numerous lung nodules suspicious for metastasis, not previously present  -pharmacogenomic shows that patient has a poor metabolizer of 5 FU, but after discussing with pharmacy, it can be given at 50% dosage  -proceed with cycle 7   -return to clinic in 2 weeks for next treatment but in-person    Altered mental status   -now improved after going to the hospital   -on Abilify which has helped a lot   -still takes Ativan as needed    Iron deficiency anemia   -improved with IV iron  -continue to monitor     Bone lesion  -seen on CT abdomen  -lesion on the L2 lumbar spine   -latest CT scan shows overall stability      Aurash Khoobehi, MD  Hematology and Oncology    Visit today included increased complexity associated with the care of the episodic problem colon cancer addressed and managing the longitudinal care of the patient due to the serious and/or complex managed problem(s).

## 2025-03-05 ENCOUNTER — INFUSION (OUTPATIENT)
Dept: INFUSION THERAPY | Facility: HOSPITAL | Age: 80
End: 2025-03-05
Attending: INTERNAL MEDICINE
Payer: MEDICARE

## 2025-03-05 VITALS
DIASTOLIC BLOOD PRESSURE: 69 MMHG | WEIGHT: 148.63 LBS | TEMPERATURE: 97 F | RESPIRATION RATE: 17 BRPM | OXYGEN SATURATION: 100 % | BODY MASS INDEX: 20.13 KG/M2 | HEIGHT: 72 IN | SYSTOLIC BLOOD PRESSURE: 135 MMHG | HEART RATE: 64 BPM

## 2025-03-05 DIAGNOSIS — C19 COLORECTAL CANCER: Primary | ICD-10-CM

## 2025-03-05 PROCEDURE — 96366 THER/PROPH/DIAG IV INF ADDON: CPT

## 2025-03-05 PROCEDURE — 96416 CHEMO PROLONG INFUSE W/PUMP: CPT

## 2025-03-05 PROCEDURE — 96413 CHEMO IV INFUSION 1 HR: CPT

## 2025-03-05 PROCEDURE — 63600175 PHARM REV CODE 636 W HCPCS: Performed by: INTERNAL MEDICINE

## 2025-03-05 PROCEDURE — 96367 TX/PROPH/DG ADDL SEQ IV INF: CPT

## 2025-03-05 PROCEDURE — 25000003 PHARM REV CODE 250: Performed by: INTERNAL MEDICINE

## 2025-03-05 RX ORDER — HEPARIN 100 UNIT/ML
500 SYRINGE INTRAVENOUS
Status: DISCONTINUED | OUTPATIENT
Start: 2025-03-05 | End: 2025-03-05 | Stop reason: HOSPADM

## 2025-03-05 RX ORDER — SODIUM CHLORIDE 0.9 % (FLUSH) 0.9 %
10 SYRINGE (ML) INJECTION
Status: DISCONTINUED | OUTPATIENT
Start: 2025-03-05 | End: 2025-03-05 | Stop reason: HOSPADM

## 2025-03-05 RX ORDER — ONDANSETRON HCL IN 0.9 % NACL 8 MG/50 ML
8 INTRAVENOUS SOLUTION, PIGGYBACK (ML) INTRAVENOUS
Status: COMPLETED | OUTPATIENT
Start: 2025-03-05 | End: 2025-03-05

## 2025-03-05 RX ORDER — PROCHLORPERAZINE EDISYLATE 5 MG/ML
5 INJECTION INTRAMUSCULAR; INTRAVENOUS ONCE AS NEEDED
Status: DISCONTINUED | OUTPATIENT
Start: 2025-03-05 | End: 2025-03-05 | Stop reason: HOSPADM

## 2025-03-05 RX ORDER — EPINEPHRINE 0.3 MG/.3ML
0.3 INJECTION SUBCUTANEOUS ONCE AS NEEDED
Status: DISCONTINUED | OUTPATIENT
Start: 2025-03-05 | End: 2025-03-05 | Stop reason: HOSPADM

## 2025-03-05 RX ORDER — DIPHENHYDRAMINE HYDROCHLORIDE 50 MG/ML
50 INJECTION INTRAMUSCULAR; INTRAVENOUS ONCE AS NEEDED
Status: DISCONTINUED | OUTPATIENT
Start: 2025-03-05 | End: 2025-03-05 | Stop reason: HOSPADM

## 2025-03-05 RX ADMIN — SODIUM CHLORIDE: 9 INJECTION, SOLUTION INTRAVENOUS at 11:03

## 2025-03-05 RX ADMIN — SODIUM CHLORIDE 385 MG: 9 INJECTION, SOLUTION INTRAVENOUS at 11:03

## 2025-03-05 RX ADMIN — SODIUM CHLORIDE 8 MG: 9 INJECTION, SOLUTION INTRAVENOUS at 11:03

## 2025-03-05 RX ADMIN — DEXTROSE MONOHYDRATE 790 MG: 50 INJECTION, SOLUTION INTRAVENOUS at 12:03

## 2025-03-05 RX ADMIN — FLUOROURACIL 2365 MG: 50 INJECTION, SOLUTION INTRAVENOUS at 02:03

## 2025-03-05 NOTE — PLAN OF CARE
Problem: Fatigue  Goal: Improved Activity Tolerance  Outcome: Progressing  Intervention: Promote Improved Energy  Flowsheets (Taken 3/5/2025 0220)  Fatigue Management: frequent rest breaks encouraged  Sleep/Rest Enhancement: regular sleep/rest pattern promoted  Activity Management: Ambulated -L4  Environmental Support: calm environment promoted

## 2025-03-07 ENCOUNTER — INFUSION (OUTPATIENT)
Dept: INFUSION THERAPY | Facility: HOSPITAL | Age: 80
End: 2025-03-07
Attending: INTERNAL MEDICINE
Payer: MEDICARE

## 2025-03-07 VITALS
OXYGEN SATURATION: 100 % | HEIGHT: 72 IN | SYSTOLIC BLOOD PRESSURE: 147 MMHG | WEIGHT: 149 LBS | HEART RATE: 74 BPM | TEMPERATURE: 97 F | RESPIRATION RATE: 18 BRPM | BODY MASS INDEX: 20.18 KG/M2 | DIASTOLIC BLOOD PRESSURE: 89 MMHG

## 2025-03-07 DIAGNOSIS — C19 COLORECTAL CANCER: Primary | ICD-10-CM

## 2025-03-07 PROCEDURE — 25000003 PHARM REV CODE 250: Performed by: INTERNAL MEDICINE

## 2025-03-07 PROCEDURE — A4216 STERILE WATER/SALINE, 10 ML: HCPCS | Performed by: INTERNAL MEDICINE

## 2025-03-07 PROCEDURE — 63600175 PHARM REV CODE 636 W HCPCS: Performed by: NURSE PRACTITIONER

## 2025-03-07 PROCEDURE — 96374 THER/PROPH/DIAG INJ IV PUSH: CPT

## 2025-03-07 PROCEDURE — 63600175 PHARM REV CODE 636 W HCPCS: Performed by: INTERNAL MEDICINE

## 2025-03-07 RX ORDER — HEPARIN 100 UNIT/ML
500 SYRINGE INTRAVENOUS
Status: DISCONTINUED | OUTPATIENT
Start: 2025-03-07 | End: 2025-03-07 | Stop reason: HOSPADM

## 2025-03-07 RX ORDER — SODIUM CHLORIDE 0.9 % (FLUSH) 0.9 %
10 SYRINGE (ML) INJECTION
Status: DISCONTINUED | OUTPATIENT
Start: 2025-03-07 | End: 2025-03-07 | Stop reason: HOSPADM

## 2025-03-07 RX ORDER — LORAZEPAM 2 MG/ML
0.5 INJECTION INTRAMUSCULAR ONCE
Status: COMPLETED | OUTPATIENT
Start: 2025-03-07 | End: 2025-03-07

## 2025-03-07 RX ORDER — PROCHLORPERAZINE EDISYLATE 5 MG/ML
5 INJECTION INTRAMUSCULAR; INTRAVENOUS ONCE AS NEEDED
Status: DISCONTINUED | OUTPATIENT
Start: 2025-03-07 | End: 2025-03-07 | Stop reason: HOSPADM

## 2025-03-07 RX ADMIN — HEPARIN 500 UNITS: 100 SYRINGE at 01:03

## 2025-03-07 RX ADMIN — SODIUM CHLORIDE, PRESERVATIVE FREE 10 ML: 5 INJECTION INTRAVENOUS at 01:03

## 2025-03-07 RX ADMIN — LORAZEPAM 0.5 MG: 2 INJECTION INTRAMUSCULAR; INTRAVENOUS at 01:03

## 2025-03-12 ENCOUNTER — PATIENT MESSAGE (OUTPATIENT)
Dept: HEMATOLOGY/ONCOLOGY | Facility: CLINIC | Age: 80
End: 2025-03-12
Payer: MEDICARE

## 2025-03-24 ENCOUNTER — PATIENT MESSAGE (OUTPATIENT)
Dept: HEMATOLOGY/ONCOLOGY | Facility: CLINIC | Age: 80
End: 2025-03-24
Payer: MEDICARE

## 2025-03-25 ENCOUNTER — OFFICE VISIT (OUTPATIENT)
Dept: HEMATOLOGY/ONCOLOGY | Facility: CLINIC | Age: 80
End: 2025-03-25
Payer: MEDICARE

## 2025-03-25 VITALS
WEIGHT: 141.75 LBS | RESPIRATION RATE: 18 BRPM | HEART RATE: 67 BPM | OXYGEN SATURATION: 98 % | BODY MASS INDEX: 19.2 KG/M2 | TEMPERATURE: 97 F | SYSTOLIC BLOOD PRESSURE: 158 MMHG | DIASTOLIC BLOOD PRESSURE: 75 MMHG | HEIGHT: 72 IN

## 2025-03-25 DIAGNOSIS — C78.02 MALIGNANT NEOPLASM METASTATIC TO BOTH LUNGS: ICD-10-CM

## 2025-03-25 DIAGNOSIS — M89.9 BONE LESION: ICD-10-CM

## 2025-03-25 DIAGNOSIS — C19 COLORECTAL CANCER: Primary | ICD-10-CM

## 2025-03-25 DIAGNOSIS — C78.01 MALIGNANT NEOPLASM METASTATIC TO BOTH LUNGS: ICD-10-CM

## 2025-03-25 DIAGNOSIS — R41.0 DISORIENTATION: ICD-10-CM

## 2025-03-25 DIAGNOSIS — E61.1 IRON DEFICIENCY: ICD-10-CM

## 2025-03-25 DIAGNOSIS — C78.7 CANCER, METASTATIC TO LIVER: ICD-10-CM

## 2025-03-25 PROCEDURE — 99999 PR PBB SHADOW E&M-EST. PATIENT-LVL III: CPT | Mod: PBBFAC,,, | Performed by: INTERNAL MEDICINE

## 2025-03-25 PROCEDURE — 1126F AMNT PAIN NOTED NONE PRSNT: CPT | Mod: CPTII,S$GLB,, | Performed by: INTERNAL MEDICINE

## 2025-03-25 PROCEDURE — 3288F FALL RISK ASSESSMENT DOCD: CPT | Mod: CPTII,S$GLB,, | Performed by: INTERNAL MEDICINE

## 2025-03-25 PROCEDURE — 1101F PT FALLS ASSESS-DOCD LE1/YR: CPT | Mod: CPTII,S$GLB,, | Performed by: INTERNAL MEDICINE

## 2025-03-25 PROCEDURE — G2211 COMPLEX E/M VISIT ADD ON: HCPCS | Mod: S$GLB,,, | Performed by: INTERNAL MEDICINE

## 2025-03-25 PROCEDURE — 99215 OFFICE O/P EST HI 40 MIN: CPT | Mod: S$GLB,,, | Performed by: INTERNAL MEDICINE

## 2025-03-25 PROCEDURE — 1160F RVW MEDS BY RX/DR IN RCRD: CPT | Mod: CPTII,S$GLB,, | Performed by: INTERNAL MEDICINE

## 2025-03-25 PROCEDURE — 1159F MED LIST DOCD IN RCRD: CPT | Mod: CPTII,S$GLB,, | Performed by: INTERNAL MEDICINE

## 2025-03-25 PROCEDURE — 3077F SYST BP >= 140 MM HG: CPT | Mod: CPTII,S$GLB,, | Performed by: INTERNAL MEDICINE

## 2025-03-25 PROCEDURE — 3078F DIAST BP <80 MM HG: CPT | Mod: CPTII,S$GLB,, | Performed by: INTERNAL MEDICINE

## 2025-03-25 RX ORDER — CYPROHEPTADINE HYDROCHLORIDE 4 MG/1
4 TABLET ORAL 3 TIMES DAILY PRN
COMMUNITY

## 2025-03-25 RX ORDER — PROCHLORPERAZINE EDISYLATE 5 MG/ML
5 INJECTION INTRAMUSCULAR; INTRAVENOUS ONCE AS NEEDED
Status: CANCELLED | OUTPATIENT
Start: 2025-03-27

## 2025-03-25 RX ORDER — SODIUM CHLORIDE 0.9 % (FLUSH) 0.9 %
10 SYRINGE (ML) INJECTION
Status: CANCELLED | OUTPATIENT
Start: 2025-03-27

## 2025-03-25 RX ORDER — EPINEPHRINE 0.3 MG/.3ML
0.3 INJECTION SUBCUTANEOUS ONCE AS NEEDED
Status: CANCELLED | OUTPATIENT
Start: 2025-03-25

## 2025-03-25 RX ORDER — PROCHLORPERAZINE EDISYLATE 5 MG/ML
5 INJECTION INTRAMUSCULAR; INTRAVENOUS ONCE AS NEEDED
Status: CANCELLED | OUTPATIENT
Start: 2025-03-25

## 2025-03-25 RX ORDER — HALOPERIDOL 0.5 MG/1
0.5 TABLET ORAL 4 TIMES DAILY
COMMUNITY

## 2025-03-25 RX ORDER — SODIUM CHLORIDE 0.9 % (FLUSH) 0.9 %
10 SYRINGE (ML) INJECTION
Status: CANCELLED | OUTPATIENT
Start: 2025-03-25

## 2025-03-25 RX ORDER — ONDANSETRON HYDROCHLORIDE 2 MG/ML
8 INJECTION, SOLUTION INTRAVENOUS
Status: CANCELLED | OUTPATIENT
Start: 2025-03-25

## 2025-03-25 RX ORDER — HEPARIN 100 UNIT/ML
500 SYRINGE INTRAVENOUS
Status: CANCELLED | OUTPATIENT
Start: 2025-03-25

## 2025-03-25 RX ORDER — CAPECITABINE 500 MG/1
1000 TABLET, FILM COATED ORAL 2 TIMES DAILY
Qty: 56 TABLET | Refills: 5 | Status: ACTIVE | OUTPATIENT
Start: 2025-03-25 | End: 2026-03-25

## 2025-03-25 RX ORDER — HEPARIN 100 UNIT/ML
500 SYRINGE INTRAVENOUS
Status: CANCELLED | OUTPATIENT
Start: 2025-03-27

## 2025-03-25 RX ORDER — DIPHENHYDRAMINE HYDROCHLORIDE 50 MG/ML
50 INJECTION, SOLUTION INTRAMUSCULAR; INTRAVENOUS ONCE AS NEEDED
Status: CANCELLED | OUTPATIENT
Start: 2025-03-25

## 2025-03-25 NOTE — PROGRESS NOTES
Service Date:  3/25/25    Chief Complaint: colon cancer    Edil Goff is a 79 y.o. male here with metastatic colorectal cancer.  Patient has a history of right hemicolectomy with pathology showing a [id number] lesion.  Patient was then started on adjuvant FOLFOX, but had poor tolerance after 1 cycle.  The 5 FU bolus was dropped and oxaliplatin dose was lowered, but patient still continued to have poor tolerance had severe protein calorie malnutrition following chemotherapy.  Therefore it was decided to be held.  Patient then had 2 liver lesions biopsied positive to be metastatic colon cancer.  He was then started on Vectibix with no improvement in the lesions, which were ultimately treated with SIRT.  He has no longer been on Vectibix for a few months.    Here for chemo clearance.  Patient's wife is concerned about his dementia worsening.  Pulls out his chemo line.  Having trouble with sleeping due to his dementia.  Having trouble with his appetite as well.    Review of Systems   Constitutional: Negative.  Negative for appetite change and unexpected weight change.   HENT: Negative.  Negative for mouth sores.    Eyes: Negative.  Negative for visual disturbance.   Respiratory: Negative.  Negative for cough and shortness of breath.    Cardiovascular: Negative.  Negative for chest pain.   Gastrointestinal:  Positive for constipation. Negative for abdominal pain and diarrhea.   Endocrine: Negative.    Genitourinary:  Positive for frequency.   Musculoskeletal: Negative.  Negative for back pain.   Integumentary:  Negative for rash. Negative.   Neurological: Negative.  Negative for headaches.   Hematological: Negative.  Negative for adenopathy.   Psychiatric/Behavioral:  Positive for behavioral problems and confusion. The patient is nervous/anxious.         Current Outpatient Medications   Medication Instructions    amLODIPine (NORVASC) 5 mg, Every morning    ascorbic acid (VITAMIN C ORAL) Every morning    aspirin 325 mg,  Daily    buPROPion (WELLBUTRIN XL) 300 mg, Nightly    capecitabine (XELODA) 1,000 mg, Oral, 2 times daily, Take for 14 days and then stop for 7 days    cyproheptadine (PERIACTIN) 4 mg, 3 times daily PRN    droNABinol (MARINOL) 2.5 mg, Oral, 2 times daily before meals, Take 1 hour before lunch and dinner    ergocalciferol, vitamin D2, (VITAMIN D2 ORAL) Daily    EScitalopram oxalate (LEXAPRO) 10 mg, Every morning    haloperidoL (HALDOL) 0.5 mg, 4 times daily    lactulose (CHRONULAC) 20 g, Oral, 3 times daily    LIDOcaine-prilocaine (EMLA) cream Topical (Top), As needed (PRN)    LORazepam (ATIVAN) 1 mg, Oral, Every 8 hours PRN    losartan (COZAAR) 50 mg, Every morning    ondansetron (ZOFRAN) 8 mg, Oral, Every 8 hours PRN    prochlorperazine (COMPAZINE) 10 mg, Oral, Every 6 hours PRN    traZODone (DESYREL) 50 mg, Nightly        Past Medical History:   Diagnosis Date    Cancer     colon    Hyperlipidemia     Hypertension     TIA (transient ischemic attack)         Past Surgical History:   Procedure Laterality Date    COLON SURGERY  2023    resection    TONSILLECTOMY          No family history on file.    Social History     Tobacco Use    Smoking status: Never    Smokeless tobacco: Never   Substance Use Topics    Alcohol use: Never    Drug use: Never         Vitals:    03/25/25 1015   BP: (!) 158/75   Pulse: 67   Resp: 18   Temp: 97.4 °F (36.3 °C)              Physical Exam:  BP (!) 158/75 (BP Location: Right arm, Patient Position: Sitting)   Pulse 67   Temp 97.4 °F (36.3 °C) (Temporal)   Resp 18   Ht 6' (1.829 m)   Wt 64.3 kg (141 lb 12.1 oz)   SpO2 98%   BMI 19.23 kg/m²     Physical Exam  Vitals and nursing note reviewed.   Constitutional:       Appearance: Normal appearance.   HENT:      Head: Normocephalic and atraumatic.      Nose: Nose normal.      Mouth/Throat:      Mouth: Mucous membranes are moist.      Pharynx: Oropharynx is clear.   Eyes:      Extraocular Movements: Extraocular movements intact.       Conjunctiva/sclera: Conjunctivae normal.   Cardiovascular:      Rate and Rhythm: Normal rate and regular rhythm.      Heart sounds: Normal heart sounds.   Pulmonary:      Effort: Pulmonary effort is normal.      Breath sounds: Normal breath sounds.   Abdominal:      General: Abdomen is flat. Bowel sounds are normal.      Palpations: Abdomen is soft.   Musculoskeletal:         General: Normal range of motion.      Cervical back: Normal range of motion and neck supple.   Skin:     General: Skin is warm and dry.   Neurological:      Mental Status: He is alert. He is disoriented and confused.   Psychiatric:         Mood and Affect: Mood normal.          Labs:  Lab Results   Component Value Date    WBC 5.74 02/11/2025    RBC 4.16 (L) 02/11/2025    HGB 12.8 (L) 02/11/2025    HCT 38.4 (L) 02/11/2025    MCV 92 02/11/2025    MCH 30.8 02/11/2025    MCHC 33.3 02/11/2025    RDW 15.6 (H) 02/11/2025     02/11/2025    MPV 8.1 (L) 02/11/2025    GRAN 3.8 02/11/2025    GRAN 66.5 02/11/2025    LYMPH 1.3 02/11/2025    LYMPH 21.8 02/11/2025    MONO 0.6 02/11/2025    MONO 9.6 02/11/2025    EOS 0.1 02/11/2025    BASO 0.00 02/11/2025    EOSINOPHIL 1.9 02/11/2025    BASOPHIL 0.0 02/11/2025     Sodium   Date Value Ref Range Status   02/11/2025 140 136 - 145 mmol/L Final     Potassium   Date Value Ref Range Status   02/11/2025 4.2 3.5 - 5.1 mmol/L Final     Chloride   Date Value Ref Range Status   02/11/2025 107 95 - 110 mmol/L Final     CO2   Date Value Ref Range Status   02/11/2025 29 23 - 29 mmol/L Final     Glucose   Date Value Ref Range Status   02/11/2025 124 (H) 70 - 110 mg/dL Final     BUN   Date Value Ref Range Status   02/11/2025 18 8 - 23 mg/dL Final     Creatinine   Date Value Ref Range Status   02/11/2025 1.3 0.5 - 1.4 mg/dL Final     Calcium   Date Value Ref Range Status   02/11/2025 9.5 8.7 - 10.5 mg/dL Final     Total Protein   Date Value Ref Range Status   02/11/2025 6.6 6.0 - 8.4 g/dL Final     Albumin   Date Value  Ref Range Status   02/11/2025 4.0 3.5 - 5.2 g/dL Final     Total Bilirubin   Date Value Ref Range Status   02/11/2025 0.7 0.1 - 1.0 mg/dL Final     Comment:     For infants and newborns, interpretation of results should be based  on gestational age, weight and in agreement with clinical  observations.    Premature Infant recommended reference ranges:  Up to 24 hours.............<8.0 mg/dL  Up to 48 hours............<12.0 mg/dL  3-5 days..................<15.0 mg/dL  6-29 days.................<15.0 mg/dL       Alkaline Phosphatase   Date Value Ref Range Status   02/11/2025 48 (L) 55 - 135 U/L Final     AST   Date Value Ref Range Status   02/11/2025 17 10 - 40 U/L Final     ALT   Date Value Ref Range Status   02/11/2025 9 (L) 10 - 44 U/L Final     Anion Gap   Date Value Ref Range Status   02/11/2025 4 (L) 8 - 16 mmol/L Final       A/P:    Loss of appetite   -comes and goes, exacerbated by dementia, due to the cancer    Metastatic colon adenocarcinoma   -oligometastatic with 2 lesions in the liver treated with SIRT  -after initial visit with me, CT chest was done which shows numerous lung nodules suspicious for metastasis, not previously present  -pharmacogenomic shows that patient has a poor metabolizer of 5 FU, but after discussing with pharmacy, it can be given at 50% dosage  -I will stop 5 FU infusions as he is pulling out his line due to the dementia.  I will start him on Xeloda as family wants to continue chemotherapy.  I will see him back 2 weeks from starting the medication    Dementia  -on Abilify   -progressively worsening   -may ultimately be the reason to stop chemotherapy, but family wants to continue for now    Iron deficiency anemia   -improved with IV iron  -continue to monitor     Bone lesion  -seen on CT abdomen  -lesion on the L2 lumbar spine   -latest CT scan shows overall stability      Aurash Khoobehi, MD  Hematology and Oncology    Visit today included increased complexity associated with the  care of the episodic problem colon cancer addressed and managing the longitudinal care of the patient due to the serious and/or complex managed problem(s).

## 2025-03-26 RX ORDER — SODIUM CHLORIDE 0.9 % (FLUSH) 0.9 %
10 SYRINGE (ML) INJECTION
OUTPATIENT
Start: 2025-04-09

## 2025-03-26 RX ORDER — HEPARIN 100 UNIT/ML
500 SYRINGE INTRAVENOUS
OUTPATIENT
Start: 2025-04-09

## 2025-03-31 ENCOUNTER — PATIENT MESSAGE (OUTPATIENT)
Dept: HEMATOLOGY/ONCOLOGY | Facility: CLINIC | Age: 80
End: 2025-03-31
Payer: MEDICARE

## 2025-03-31 DIAGNOSIS — C78.01 MALIGNANT NEOPLASM METASTATIC TO BOTH LUNGS: Primary | ICD-10-CM

## 2025-03-31 DIAGNOSIS — C78.02 MALIGNANT NEOPLASM METASTATIC TO BOTH LUNGS: Primary | ICD-10-CM

## 2025-03-31 DIAGNOSIS — C78.7 CANCER, METASTATIC TO LIVER: ICD-10-CM

## 2025-04-02 ENCOUNTER — TELEPHONE (OUTPATIENT)
Dept: HEMATOLOGY/ONCOLOGY | Facility: CLINIC | Age: 80
End: 2025-04-02
Payer: MEDICARE

## 2025-04-02 NOTE — TELEPHONE ENCOUNTER
Spoke with pt's daughter to inquire if pt has been contacted by [facility]. She states that they have decided to go with [facility], which PCP suggested. They will be seeing nurse this afternoon. Pt's daughter asked to cancel port flushes and will keep in touch with clinic for any further concerns.

## 2025-04-14 ENCOUNTER — TELEPHONE (OUTPATIENT)
Dept: HEMATOLOGY/ONCOLOGY | Facility: CLINIC | Age: 80
End: 2025-04-14
Payer: MEDICARE

## 2025-04-14 NOTE — TELEPHONE ENCOUNTER
----- Message from Hilary sent at 4/14/2025  4:07 PM CDT -----  Contact: [facility]  Type:  Needs Medical AdviceWho Called: [facility] home careWould the patient rather a call back or a response via [email or web address]?  Call [facility] Call Back Number:  [phone] Information: sent some request for orders several time over the last month and not heard back.Confirmed fax number and she is resending todayFax [phone] return to above fax number and call to advisSunil

## 2025-04-29 ENCOUNTER — PATIENT MESSAGE (OUTPATIENT)
Dept: HEMATOLOGY/ONCOLOGY | Facility: CLINIC | Age: 80
End: 2025-04-29
Payer: MEDICARE